# Patient Record
Sex: FEMALE | Race: WHITE | NOT HISPANIC OR LATINO | Employment: PART TIME | URBAN - METROPOLITAN AREA
[De-identification: names, ages, dates, MRNs, and addresses within clinical notes are randomized per-mention and may not be internally consistent; named-entity substitution may affect disease eponyms.]

---

## 2022-07-06 ENCOUNTER — HOSPITAL ENCOUNTER (OUTPATIENT)
Dept: RADIOLOGY | Facility: MEDICAL CENTER | Age: 22
End: 2022-07-06
Payer: COMMERCIAL

## 2022-07-06 ENCOUNTER — HOSPITAL ENCOUNTER (INPATIENT)
Facility: MEDICAL CENTER | Age: 22
LOS: 1 days | DRG: 143 | End: 2022-07-09
Attending: STUDENT IN AN ORGANIZED HEALTH CARE EDUCATION/TRAINING PROGRAM | Admitting: INTERNAL MEDICINE
Payer: COMMERCIAL

## 2022-07-06 DIAGNOSIS — J36 PERITONSILLAR ABSCESS: ICD-10-CM

## 2022-07-06 PROCEDURE — 36415 COLL VENOUS BLD VENIPUNCTURE: CPT

## 2022-07-06 PROCEDURE — 83735 ASSAY OF MAGNESIUM: CPT

## 2022-07-06 PROCEDURE — 99285 EMERGENCY DEPT VISIT HI MDM: CPT

## 2022-07-06 PROCEDURE — 85027 COMPLETE CBC AUTOMATED: CPT

## 2022-07-06 PROCEDURE — 80053 COMPREHEN METABOLIC PANEL: CPT

## 2022-07-06 PROCEDURE — 84703 CHORIONIC GONADOTROPIN ASSAY: CPT

## 2022-07-06 ASSESSMENT — PAIN DESCRIPTION - PAIN TYPE: TYPE: ACUTE PAIN

## 2022-07-07 PROBLEM — J36 PERITONSILLAR ABSCESS: Status: ACTIVE | Noted: 2022-07-07

## 2022-07-07 PROBLEM — F31.9 BIPOLAR DISORDER (HCC): Status: ACTIVE | Noted: 2022-07-07

## 2022-07-07 PROBLEM — A41.9 SEPSIS (HCC): Status: ACTIVE | Noted: 2022-07-07

## 2022-07-07 LAB
ALBUMIN SERPL BCP-MCNC: 4.1 G/DL (ref 3.2–4.9)
ALBUMIN/GLOB SERPL: 1.7 G/DL
ALP SERPL-CCNC: 61 U/L (ref 30–99)
ALT SERPL-CCNC: 11 U/L (ref 2–50)
ANION GAP SERPL CALC-SCNC: 14 MMOL/L (ref 7–16)
AST SERPL-CCNC: 13 U/L (ref 12–45)
BILIRUB SERPL-MCNC: 0.9 MG/DL (ref 0.1–1.5)
BUN SERPL-MCNC: 7 MG/DL (ref 8–22)
CALCIUM SERPL-MCNC: 8.4 MG/DL (ref 8.5–10.5)
CHLORIDE SERPL-SCNC: 104 MMOL/L (ref 96–112)
CO2 SERPL-SCNC: 19 MMOL/L (ref 20–33)
CREAT SERPL-MCNC: 0.63 MG/DL (ref 0.5–1.4)
ERYTHROCYTE [DISTWIDTH] IN BLOOD BY AUTOMATED COUNT: 42.8 FL (ref 35.9–50)
GFR SERPLBLD CREATININE-BSD FMLA CKD-EPI: 129 ML/MIN/1.73 M 2
GLOBULIN SER CALC-MCNC: 2.4 G/DL (ref 1.9–3.5)
GLUCOSE SERPL-MCNC: 113 MG/DL (ref 65–99)
HCG SERPL QL: NEGATIVE
HCT VFR BLD AUTO: 39.6 % (ref 37–47)
HGB BLD-MCNC: 13.2 G/DL (ref 12–16)
MAGNESIUM SERPL-MCNC: 1.5 MG/DL (ref 1.5–2.5)
MCH RBC QN AUTO: 30.6 PG (ref 27–33)
MCHC RBC AUTO-ENTMCNC: 33.3 G/DL (ref 33.6–35)
MCV RBC AUTO: 91.9 FL (ref 81.4–97.8)
PLATELET # BLD AUTO: 206 K/UL (ref 164–446)
PMV BLD AUTO: 12.3 FL (ref 9–12.9)
POTASSIUM SERPL-SCNC: 3.1 MMOL/L (ref 3.6–5.5)
PROT SERPL-MCNC: 6.5 G/DL (ref 6–8.2)
RBC # BLD AUTO: 4.31 M/UL (ref 4.2–5.4)
SCCMEC + MECA PNL NOSE NAA+PROBE: NEGATIVE
SODIUM SERPL-SCNC: 137 MMOL/L (ref 135–145)
WBC # BLD AUTO: 13.4 K/UL (ref 4.8–10.8)

## 2022-07-07 PROCEDURE — 700102 HCHG RX REV CODE 250 W/ 637 OVERRIDE(OP): Performed by: STUDENT IN AN ORGANIZED HEALTH CARE EDUCATION/TRAINING PROGRAM

## 2022-07-07 PROCEDURE — 96366 THER/PROPH/DIAG IV INF ADDON: CPT

## 2022-07-07 PROCEDURE — 96375 TX/PRO/DX INJ NEW DRUG ADDON: CPT

## 2022-07-07 PROCEDURE — 700111 HCHG RX REV CODE 636 W/ 250 OVERRIDE (IP): Performed by: STUDENT IN AN ORGANIZED HEALTH CARE EDUCATION/TRAINING PROGRAM

## 2022-07-07 PROCEDURE — G0378 HOSPITAL OBSERVATION PER HR: HCPCS

## 2022-07-07 PROCEDURE — 96376 TX/PRO/DX INJ SAME DRUG ADON: CPT

## 2022-07-07 PROCEDURE — 700111 HCHG RX REV CODE 636 W/ 250 OVERRIDE (IP)

## 2022-07-07 PROCEDURE — A9270 NON-COVERED ITEM OR SERVICE: HCPCS | Performed by: STUDENT IN AN ORGANIZED HEALTH CARE EDUCATION/TRAINING PROGRAM

## 2022-07-07 PROCEDURE — 99218 PR INITIAL OBSERVATION CARE,LEVL I: CPT | Mod: GC | Performed by: STUDENT IN AN ORGANIZED HEALTH CARE EDUCATION/TRAINING PROGRAM

## 2022-07-07 PROCEDURE — 87641 MR-STAPH DNA AMP PROBE: CPT

## 2022-07-07 PROCEDURE — 700102 HCHG RX REV CODE 250 W/ 637 OVERRIDE(OP)

## 2022-07-07 PROCEDURE — 700111 HCHG RX REV CODE 636 W/ 250 OVERRIDE (IP): Performed by: NURSE PRACTITIONER

## 2022-07-07 PROCEDURE — 96365 THER/PROPH/DIAG IV INF INIT: CPT

## 2022-07-07 PROCEDURE — 700105 HCHG RX REV CODE 258: Performed by: STUDENT IN AN ORGANIZED HEALTH CARE EDUCATION/TRAINING PROGRAM

## 2022-07-07 PROCEDURE — 96374 THER/PROPH/DIAG INJ IV PUSH: CPT

## 2022-07-07 PROCEDURE — A9270 NON-COVERED ITEM OR SERVICE: HCPCS

## 2022-07-07 PROCEDURE — 42700 I&D ABSCESS PERITONSILLAR: CPT

## 2022-07-07 RX ORDER — CHOLECALCIFEROL (VITAMIN D3) 125 MCG
5 CAPSULE ORAL NIGHTLY PRN
Status: DISCONTINUED | OUTPATIENT
Start: 2022-07-07 | End: 2022-07-09 | Stop reason: HOSPADM

## 2022-07-07 RX ORDER — LAMOTRIGINE 100 MG/1
25 TABLET ORAL DAILY
Status: DISCONTINUED | OUTPATIENT
Start: 2022-07-07 | End: 2022-07-09 | Stop reason: HOSPADM

## 2022-07-07 RX ORDER — ONDANSETRON 2 MG/ML
4 INJECTION INTRAMUSCULAR; INTRAVENOUS EVERY 4 HOURS PRN
Status: DISCONTINUED | OUTPATIENT
Start: 2022-07-07 | End: 2022-07-09 | Stop reason: HOSPADM

## 2022-07-07 RX ORDER — POLYETHYLENE GLYCOL 3350 17 G/17G
1 POWDER, FOR SOLUTION ORAL
Status: DISCONTINUED | OUTPATIENT
Start: 2022-07-07 | End: 2022-07-09 | Stop reason: HOSPADM

## 2022-07-07 RX ORDER — BISACODYL 10 MG
10 SUPPOSITORY, RECTAL RECTAL
Status: DISCONTINUED | OUTPATIENT
Start: 2022-07-07 | End: 2022-07-09 | Stop reason: HOSPADM

## 2022-07-07 RX ORDER — LAMOTRIGINE 25 MG/1
25 TABLET ORAL DAILY
COMMUNITY

## 2022-07-07 RX ORDER — ONDANSETRON 4 MG/1
4 TABLET, ORALLY DISINTEGRATING ORAL EVERY 4 HOURS PRN
Status: DISCONTINUED | OUTPATIENT
Start: 2022-07-07 | End: 2022-07-07

## 2022-07-07 RX ORDER — HYDROMORPHONE HYDROCHLORIDE 1 MG/ML
0.5 INJECTION, SOLUTION INTRAMUSCULAR; INTRAVENOUS; SUBCUTANEOUS ONCE
Status: COMPLETED | OUTPATIENT
Start: 2022-07-07 | End: 2022-07-07

## 2022-07-07 RX ORDER — ACETAMINOPHEN 325 MG/1
650 TABLET ORAL ONCE
Status: COMPLETED | OUTPATIENT
Start: 2022-07-07 | End: 2022-07-07

## 2022-07-07 RX ORDER — PROMETHAZINE HYDROCHLORIDE 25 MG/1
12.5-25 SUPPOSITORY RECTAL EVERY 4 HOURS PRN
Status: DISCONTINUED | OUTPATIENT
Start: 2022-07-07 | End: 2022-07-09 | Stop reason: HOSPADM

## 2022-07-07 RX ORDER — MORPHINE SULFATE 4 MG/ML
2 INJECTION INTRAVENOUS EVERY 4 HOURS PRN
Status: DISCONTINUED | OUTPATIENT
Start: 2022-07-07 | End: 2022-07-07

## 2022-07-07 RX ORDER — AMOXICILLIN 250 MG
2 CAPSULE ORAL 2 TIMES DAILY
Status: DISCONTINUED | OUTPATIENT
Start: 2022-07-07 | End: 2022-07-09 | Stop reason: HOSPADM

## 2022-07-07 RX ORDER — MORPHINE SULFATE 4 MG/ML
1 INJECTION INTRAVENOUS EVERY 4 HOURS PRN
Status: DISCONTINUED | OUTPATIENT
Start: 2022-07-07 | End: 2022-07-07

## 2022-07-07 RX ORDER — ACETAMINOPHEN 325 MG/1
650 TABLET ORAL EVERY 6 HOURS PRN
Status: DISCONTINUED | OUTPATIENT
Start: 2022-07-07 | End: 2022-07-09

## 2022-07-07 RX ORDER — KETOROLAC TROMETHAMINE 30 MG/ML
15 INJECTION, SOLUTION INTRAMUSCULAR; INTRAVENOUS EVERY 6 HOURS PRN
Status: DISCONTINUED | OUTPATIENT
Start: 2022-07-07 | End: 2022-07-09

## 2022-07-07 RX ORDER — METRONIDAZOLE 500 MG/1
500 TABLET ORAL 2 TIMES DAILY
Status: ON HOLD | COMMUNITY
Start: 2022-06-14 | End: 2022-07-09

## 2022-07-07 RX ORDER — PROCHLORPERAZINE EDISYLATE 5 MG/ML
5-10 INJECTION INTRAMUSCULAR; INTRAVENOUS EVERY 4 HOURS PRN
Status: DISCONTINUED | OUTPATIENT
Start: 2022-07-07 | End: 2022-07-09 | Stop reason: HOSPADM

## 2022-07-07 RX ORDER — SODIUM CHLORIDE, SODIUM LACTATE, POTASSIUM CHLORIDE, CALCIUM CHLORIDE 600; 310; 30; 20 MG/100ML; MG/100ML; MG/100ML; MG/100ML
INJECTION, SOLUTION INTRAVENOUS CONTINUOUS
Status: DISCONTINUED | OUTPATIENT
Start: 2022-07-07 | End: 2022-07-08

## 2022-07-07 RX ORDER — PROMETHAZINE HYDROCHLORIDE 25 MG/1
12.5-25 TABLET ORAL EVERY 4 HOURS PRN
Status: DISCONTINUED | OUTPATIENT
Start: 2022-07-07 | End: 2022-07-09 | Stop reason: HOSPADM

## 2022-07-07 RX ORDER — HYDROMORPHONE HYDROCHLORIDE 1 MG/ML
0.5 INJECTION, SOLUTION INTRAMUSCULAR; INTRAVENOUS; SUBCUTANEOUS EVERY 4 HOURS PRN
Status: DISCONTINUED | OUTPATIENT
Start: 2022-07-07 | End: 2022-07-07

## 2022-07-07 RX ORDER — HYDROMORPHONE HYDROCHLORIDE 1 MG/ML
0.5 INJECTION, SOLUTION INTRAMUSCULAR; INTRAVENOUS; SUBCUTANEOUS
Status: DISCONTINUED | OUTPATIENT
Start: 2022-07-07 | End: 2022-07-09 | Stop reason: HOSPADM

## 2022-07-07 RX ORDER — KETOROLAC TROMETHAMINE 30 MG/ML
15 INJECTION, SOLUTION INTRAMUSCULAR; INTRAVENOUS EVERY 6 HOURS PRN
Status: DISCONTINUED | OUTPATIENT
Start: 2022-07-07 | End: 2022-07-07

## 2022-07-07 RX ADMIN — BENZOCAINE, BUTAMBEN, AND TETRACAINE HYDROCHLORIDE 1 SPRAY: .028; .004; .004 AEROSOL, SPRAY TOPICAL at 00:01

## 2022-07-07 RX ADMIN — KETOROLAC TROMETHAMINE 15 MG: 30 INJECTION, SOLUTION INTRAMUSCULAR; INTRAVENOUS at 11:07

## 2022-07-07 RX ADMIN — SODIUM CHLORIDE, POTASSIUM CHLORIDE, SODIUM LACTATE AND CALCIUM CHLORIDE: 600; 310; 30; 20 INJECTION, SOLUTION INTRAVENOUS at 10:03

## 2022-07-07 RX ADMIN — FENTANYL CITRATE 100 MCG: 50 INJECTION, SOLUTION INTRAMUSCULAR; INTRAVENOUS at 00:00

## 2022-07-07 RX ADMIN — HYDROMORPHONE HYDROCHLORIDE 0.5 MG: 1 INJECTION, SOLUTION INTRAMUSCULAR; INTRAVENOUS; SUBCUTANEOUS at 17:58

## 2022-07-07 RX ADMIN — HYDROMORPHONE HYDROCHLORIDE 0.5 MG: 1 INJECTION, SOLUTION INTRAMUSCULAR; INTRAVENOUS; SUBCUTANEOUS at 09:10

## 2022-07-07 RX ADMIN — LAMOTRIGINE 25 MG: 100 TABLET ORAL at 05:49

## 2022-07-07 RX ADMIN — KETOROLAC TROMETHAMINE 15 MG: 30 INJECTION, SOLUTION INTRAMUSCULAR; INTRAVENOUS at 02:59

## 2022-07-07 RX ADMIN — SODIUM CHLORIDE 3 G: 900 INJECTION INTRAVENOUS at 05:48

## 2022-07-07 RX ADMIN — SODIUM CHLORIDE 3 G: 900 INJECTION INTRAVENOUS at 12:48

## 2022-07-07 RX ADMIN — ONDANSETRON 4 MG: 2 INJECTION INTRAMUSCULAR; INTRAVENOUS at 11:07

## 2022-07-07 RX ADMIN — SODIUM CHLORIDE, POTASSIUM CHLORIDE, SODIUM LACTATE AND CALCIUM CHLORIDE: 600; 310; 30; 20 INJECTION, SOLUTION INTRAVENOUS at 19:51

## 2022-07-07 RX ADMIN — KETOROLAC TROMETHAMINE 15 MG: 30 INJECTION, SOLUTION INTRAMUSCULAR; INTRAVENOUS at 21:44

## 2022-07-07 RX ADMIN — HYDROMORPHONE HYDROCHLORIDE 0.5 MG: 1 INJECTION, SOLUTION INTRAMUSCULAR; INTRAVENOUS; SUBCUTANEOUS at 13:57

## 2022-07-07 RX ADMIN — ACETAMINOPHEN 650 MG: 325 TABLET, FILM COATED ORAL at 01:15

## 2022-07-07 RX ADMIN — HYDROMORPHONE HYDROCHLORIDE 0.5 MG: 1 INJECTION, SOLUTION INTRAMUSCULAR; INTRAVENOUS; SUBCUTANEOUS at 05:48

## 2022-07-07 RX ADMIN — HYDROMORPHONE HYDROCHLORIDE 0.5 MG: 1 INJECTION, SOLUTION INTRAMUSCULAR; INTRAVENOUS; SUBCUTANEOUS at 10:02

## 2022-07-07 RX ADMIN — FENTANYL CITRATE 100 MCG: 50 INJECTION, SOLUTION INTRAMUSCULAR; INTRAVENOUS at 00:51

## 2022-07-07 RX ADMIN — ACETAMINOPHEN 650 MG: 325 TABLET, FILM COATED ORAL at 21:32

## 2022-07-07 RX ADMIN — PROCHLORPERAZINE EDISYLATE 5 MG: 5 INJECTION INTRAMUSCULAR; INTRAVENOUS at 17:54

## 2022-07-07 RX ADMIN — SODIUM CHLORIDE 3 G: 900 INJECTION INTRAVENOUS at 17:58

## 2022-07-07 RX ADMIN — ONDANSETRON 4 MG: 2 INJECTION INTRAMUSCULAR; INTRAVENOUS at 15:22

## 2022-07-07 RX ADMIN — SODIUM CHLORIDE, POTASSIUM CHLORIDE, SODIUM LACTATE AND CALCIUM CHLORIDE: 600; 310; 30; 20 INJECTION, SOLUTION INTRAVENOUS at 05:58

## 2022-07-07 RX ADMIN — SODIUM CHLORIDE, POTASSIUM CHLORIDE, SODIUM LACTATE AND CALCIUM CHLORIDE: 600; 310; 30; 20 INJECTION, SOLUTION INTRAVENOUS at 02:59

## 2022-07-07 ASSESSMENT — PAIN DESCRIPTION - PAIN TYPE
TYPE: ACUTE PAIN

## 2022-07-07 ASSESSMENT — ENCOUNTER SYMPTOMS
EYES NEGATIVE: 1
HEARTBURN: 0
DEPRESSION: 1
DIARRHEA: 0
WHEEZING: 0
SORE THROAT: 1
CARDIOVASCULAR NEGATIVE: 1
POLYDIPSIA: 0
ABDOMINAL PAIN: 0
DIAPHORESIS: 1
WEIGHT LOSS: 0
MYALGIAS: 1
NEUROLOGICAL NEGATIVE: 1
CHILLS: 1
SHORTNESS OF BREATH: 0
CONSTIPATION: 0
DIZZINESS: 0
RESPIRATORY NEGATIVE: 1
FEVER: 1
VOMITING: 0
BLURRED VISION: 0
COUGH: 0
NAUSEA: 1

## 2022-07-07 ASSESSMENT — LIFESTYLE VARIABLES
EVER FELT BAD OR GUILTY ABOUT YOUR DRINKING: NO
HAVE PEOPLE ANNOYED YOU BY CRITICIZING YOUR DRINKING: NO
ON A TYPICAL DAY WHEN YOU DRINK ALCOHOL HOW MANY DRINKS DO YOU HAVE: 0
CONSUMPTION TOTAL: NEGATIVE
HAVE YOU EVER FELT YOU SHOULD CUT DOWN ON YOUR DRINKING: NO
TOTAL SCORE: 0
HOW MANY TIMES IN THE PAST YEAR HAVE YOU HAD 5 OR MORE DRINKS IN A DAY: 0
EVER HAD A DRINK FIRST THING IN THE MORNING TO STEADY YOUR NERVES TO GET RID OF A HANGOVER: NO
ALCOHOL_USE: NO
AVERAGE NUMBER OF DAYS PER WEEK YOU HAVE A DRINK CONTAINING ALCOHOL: 0
TOTAL SCORE: 0
TOTAL SCORE: 0

## 2022-07-07 ASSESSMENT — PATIENT HEALTH QUESTIONNAIRE - PHQ9
6. FEELING BAD ABOUT YOURSELF - OR THAT YOU ARE A FAILURE OR HAVE LET YOURSELF OR YOUR FAMILY DOWN: SEVERAL DAYS
5. POOR APPETITE OR OVEREATING: SEVERAL DAYS
SUM OF ALL RESPONSES TO PHQ9 QUESTIONS 1 AND 2: 2
1. LITTLE INTEREST OR PLEASURE IN DOING THINGS: SEVERAL DAYS
9. THOUGHTS THAT YOU WOULD BE BETTER OFF DEAD, OR OF HURTING YOURSELF: NOT AT ALL
2. FEELING DOWN, DEPRESSED, IRRITABLE, OR HOPELESS: SEVERAL DAYS
7. TROUBLE CONCENTRATING ON THINGS, SUCH AS READING THE NEWSPAPER OR WATCHING TELEVISION: NOT AT ALL
3. TROUBLE FALLING OR STAYING ASLEEP OR SLEEPING TOO MUCH: SEVERAL DAYS
8. MOVING OR SPEAKING SO SLOWLY THAT OTHER PEOPLE COULD HAVE NOTICED. OR THE OPPOSITE, BEING SO FIGETY OR RESTLESS THAT YOU HAVE BEEN MOVING AROUND A LOT MORE THAN USUAL: NOT AT ALL
SUM OF ALL RESPONSES TO PHQ QUESTIONS 1-9: 6
4. FEELING TIRED OR HAVING LITTLE ENERGY: SEVERAL DAYS

## 2022-07-07 ASSESSMENT — PAIN SCALES - WONG BAKER: WONGBAKER_NUMERICALRESPONSE: DOESN'T HURT AT ALL

## 2022-07-07 NOTE — HOSPITAL COURSE
This  is a 21-year-old female with a past medical history significant for mood disorder on Lamictal presented to the ER on 7/7/2022 after she had noted to have throat pain.  He stated that he has been having throat pain for the last 5 days associated with fever, chills, nausea    She was initially involved in facility, noted to have 3.3 cm peritonsillar abscess, transferred here for ENT consultation.  At the outlying facility, she was started on broad-spectrum antibiotics including vancomycin and Zosyn.  During the stay in the hospital, ENT was consulted, patient underwent tonsillectomy on 7/8/2022.  It is noted that patient had activity tonsillitis of the right tonsil, edematous infected left tonsil without defined abscess.    During the stay in hospital, patient continued to remain on IV antibiotics and Unasyn.  ENT continue to follow the patient. ENT recs to discharge patient on Po abx Augmentin 875/125 1 tablet p.o. twice daily.  Patient was discharged on oxycodone, Tylenol, ibuprofen.  She will follow-up with ENT as an outpatient.    At this time, she has been medically stable to be discharged home.

## 2022-07-07 NOTE — ED NOTES
Med rec completed per patient  Allergies reviewed    Patient states she completed a 9 day course of an antibiotic approximately 2 weeks ago, she is unsure what the medication was. Home pharmacy ([facility]) is currently closed. Unable to confirm medication at this time

## 2022-07-07 NOTE — ASSESSMENT & PLAN NOTE
-Patient with 5 days sore throat. Somewhat increased risk of Lemierre's disease but unlikely. No CT evidence of David's angina. Most likely peritonsillar abscess vs. Phlegmon, so far not amenable to aspiration.  -ENT consulted -attempted aspiration in ER but failed; anticipated surgical intervention on 7/8 at 1700  -Unasyn  -IVF as indicated, continuous 83cc/hour currently, s/p IVF per sepsis protocol at outside hospital  -Monitor patient after procedure for pain management and complications post surgery

## 2022-07-07 NOTE — CARE PLAN
The patient is Stable - Low risk of patient condition declining or worsening    Shift Goals  Clinical Goals: pain control, ABX  Patient Goals: pain control  Family Goals: pain control    Progress made toward(s) clinical / shift goals:    Problem: Pain - Standard  Goal: Alleviation of pain or a reduction in pain to the patient’s comfort goal  Outcome: Progressing     Problem: Knowledge Deficit - Standard  Goal: Patient and family/care givers will demonstrate understanding of plan of care, disease process/condition, diagnostic tests and medications  Outcome: Progressing       Patient is not progressing towards the following goals:

## 2022-07-07 NOTE — PROGRESS NOTES
Med rec updated     Patient home pharmacy reports patient received a 7 day course of Flagyl on 06/14/2022

## 2022-07-07 NOTE — ASSESSMENT & PLAN NOTE
This is Sepsis Present on admission  SIRS criteria identified on my evaluation include: Fever, with temperature greater than 101 deg F, Tachycardia, with heart rate greater than 90 BPM and Tachypnea, with respirations greater than 20 per minute  Source is Peritonsillar abscess  Sepsis protocol initiated  Fluid resuscitation ordered per protocol  Crystalloid Fluid Administration: Fluid resuscitation ordered per standard protocol - 30 mL/kg per current or ideal body weight  IV antibiotics as appropriate for source of sepsis  Reassessment: I have reassessed the patient's hemodynamic status

## 2022-07-07 NOTE — ED PROVIDER NOTES
CHIEF COMPLAINT  Chief Complaint   Patient presents with   • Abscess       HPI  Denia Lewis is a 21 y.o. female who presents as a transfer from [facility] in [place] for evaluation of a left 3 x 3 cm peritonsillar abscess which was unable to be drained at the outside facility.  States that since Friday she has had a sore throat that progressively got worse today so she went to the ER.  She was initially complaining of 7 out of 10 throbbing sensation on the left side of her neck.  Did complain of pain with swallowing though denied any inability to swallow.  She was seen at the outside facility where she had a CT neck which did show a left-sided peritonsillar abscess attempted aspiration was unsuccessful in the emergency department as such she was transferred for ear nose throat evaluation.  In the emergency departments at the outside facility the patient was given Zosyn and vancomycin, she had a brief period of hypotension with a blood pressure in the 90 systolic range after IV fluids as such she was started on Levophed.  This was discontinued immediately upon arrival in the emergency department  REVIEW OF SYSTEMS  See HPI for further details. All other systems are negative.     PAST MEDICAL HISTORY   has a past medical history of Psychiatric disorder.    SOCIAL HISTORY  Social History     Tobacco Use   • Smoking status: Never Smoker   • Smokeless tobacco: Never Used   Vaping Use   • Vaping Use: Never used   Substance and Sexual Activity   • Alcohol use: Never   • Drug use: Never   • Sexual activity: Not on file       SURGICAL HISTORY  patient denies any surgical history    CURRENT MEDICATIONS  Home Medications     Reviewed by Josie Rueda R.N. (Registered Nurse) on 07/06/22 at 3358  Med List Status: Not Addressed   Medication Last Dose Status        Patient James Taking any Medications                       ALLERGIES  No Known Allergies    FAMILY HISTORY  No pertinent family history    PHYSICAL EXAM   /57   " Pulse (!) 120   Temp (!) 38.4 °C (101.1 °F) (Oral)   Resp 18   Ht 1.651 m (5' 5\")   Wt 90.7 kg (200 lb)   SpO2 96%   BMI 33.28 kg/m²  @[id number]::@   Pulse ox interpretation: I interpret this pulse ox as normal.  VITALS - vital signs documented prior to this note have been reviewed and noted,  GENERAL - awake, alert, oriented, GCS 15, no apparent distress, non-toxic  appearing  HEENT - normocephalic, atraumatic, pupils equal, sclera anicteric, mucus  membranes moist she has pharyngeal exudates on the left and a bulging erythematous tonsillar pillar on the left  NECK - supple, no meningismus, full active range of motion, trachea midline  CARDIOVASCULAR - regular rate/rhythm, no murmurs/gallops/rubs  PULMONARY - no respiratory distress, speaking in full sentences, clear to  auscultation bilaterally, no wheezing/ronchi/rales, no accessory muscle use  GASTROINTESTINAL - soft, non-tender, non-distended, no rebound, guarding,  or peritonitis  GENITOURINARY - Deferred  NEUROLOGIC - Awake alert, normal mental status, speech fluid, cognition  normal, moves all extremities  MUSCULOSKELETAL - no obvious asymmetry or deformities present  EXTREMITIES - warm, well-perfused, no cyanosis or significant edema  DERMATOLOGIC - warm, dry, no rashes, no jaundice  PSYCHIATRIC - normal affect, normal insight, normal concentration            LABS      Labs Reviewed - No data to display      Pertinent Labs & Imaging studies reviewed. (See chart for details)    RADIOLOGY  No orders to display             ED COURSE/PROCEDURES      Abscess drainage (Incision/Drainage)  Performed by: Dr Huerta  Consent: Verbal consent obtained.  required.  Type: abscess  Location details: Left tonsillar pillars  Anesthesia: local infiltration  Local anesthetic: lidocaine 1% with epinephrine atomized onto the tonsils  Anesthetic total: 10 ml  Patient sedated: no  Scalpel size: 18-gauge needle  Incision type: 3 simple stab incisions along the superior " middle and inferior tonsillar pillars  Complexity: simple  Drainage: None  Patient tolerance: Patient tolerated the procedure well with no immediate complications.    Medications - No data to display            MEDICAL DECISION MAKING    Patient presented for evaluation of a peritonsillar abscess which was unavailable to be drained at the outside facility.  Review of labs and imaging did appear consistent with a PTA.  I did discuss case with on-call otolaryngologist  who recommended repeat attempt at drainage and if again failed in the ED admission and she will evaluate the patient in the morning.   Aspiration was unsuccessful in the emergency department, patient did tolerate the procedure well, may represent an underlying phlegmon given the multiple failed aspiration attempt, continued tachycardia, SIRS syndrome she will be admitted to the hospital for further care and evaluation pending ENT consult in the morning.      FINAL IMPRESSION  1.  Peritonsillar abscess           Electronically signed by: Nikolai Bernal D.O., 7/6/2022 11:02 PM      Dictation Disclaimer  Please note this report has been produced using speech recognition software and  may contain errors related to that system, including errors seen in grammar,  punctuation and spelling, as well as words and phrases that may be inappropriate.  If there are any questions or concerns, please feel free to contact the dictating  physician for clarification.

## 2022-07-07 NOTE — H&P
Surgery Otolaryngology History & Physical Note    Date  7/7/2022    Primary Care Physician  No primary care provider on file.    CC  Left peritonsillar space infection    HPI  This is a 21 y.o. female who presented with left throat infection, transferred from outside hospital.  Per ED notes, sore throat since Friday, went to OSH with sepsis and CT c/w left peritonsillar phlegmon.  OSH ED attempted drainage unsuccessfully.  Pt was trasnferred for ENT evaluation.  ED provider attempted I&D again with no purulence. No significant change in symptoms since last night.  No ear pain, trismus, or dyspnea.  She is having odynophagia and sore throat. She has a history of previous tonsillar infections but no known abscess.     Past Medical History:   Diagnosis Date   • Psychiatric disorder     bipolar disorder       History reviewed. No pertinent surgical history.    Current Facility-Administered Medications   Medication Dose Route Frequency Provider Last Rate Last Admin   • senna-docusate (PERICOLACE or SENOKOT S) 8.6-50 MG per tablet 2 Tablet  2 Tablet Oral BID Clemente Pugh M.D.        And   • polyethylene glycol/lytes (MIRALAX) PACKET 1 Packet  1 Packet Oral QDAY PRN Clemente Pugh M.D.        And   • magnesium hydroxide (MILK OF MAGNESIA) suspension 30 mL  30 mL Oral QDAY PRN Clemente Pugh M.D.        And   • bisacodyl (DULCOLAX) suppository 10 mg  10 mg Rectal QDAY PRN Clemente Pugh M.D.       • lactated ringers infusion   Intravenous Continuous Clemente Pugh M.D. 83 mL/hr at 07/07/22 0558 New Bag at 07/07/22 0558   • ondansetron (ZOFRAN) syringe/vial injection 4 mg  4 mg Intravenous Q4HRS PRN Clemente Pugh M.D.       • [Held by provider] promethazine (PHENERGAN) tablet 12.5-25 mg  12.5-25 mg Oral Q4HRS PRN Clemente Pugh M.D.       • promethazine (PHENERGAN) suppository 12.5-25 mg  12.5-25 mg Rectal Q4HRS PRN Clemente Pugh M.D.       • prochlorperazine (COMPAZINE) injection 5-10 mg  5-10 mg  Intravenous Q4HRS PRN Clemente Pugh M.D.       • melatonin tablet 5 mg  5 mg Oral HS PRN Clemente Pugh M.D.       • lamoTRIgine (LAMICTAL) tablet 25 mg  25 mg Oral DAILY Clemente Pugh M.D.   25 mg at 07/07/22 0549   • ampicillin/sulbactam (UNASYN) 3 g in  mL IVPB  3 g Intravenous Q6HRS Clemente Pugh M.D. 200 mL/hr at 07/07/22 0548 3 g at 07/07/22 0548   • HYDROmorphone (Dilaudid) injection 0.5 mg  0.5 mg Intravenous Q4HRS PRN Clemente Pugh M.D.           Social History     Socioeconomic History   • Marital status: Single     Spouse name: Not on file   • Number of children: Not on file   • Years of education: Not on file   • Highest education level: Not on file   Occupational History   • Not on file   Tobacco Use   • Smoking status: Never Smoker   • Smokeless tobacco: Never Used   Vaping Use   • Vaping Use: Never used   Substance and Sexual Activity   • Alcohol use: Never   • Drug use: Never   • Sexual activity: Not on file   Other Topics Concern   • Not on file   Social History Narrative   • Not on file     Social Determinants of Health     Financial Resource Strain: Not on file   Food Insecurity: Not on file   Transportation Needs: Not on file   Physical Activity: Not on file   Stress: Not on file   Social Connections: Not on file   Intimate Partner Violence: Not on file   Housing Stability: Not on file       History reviewed. No pertinent family history.    Allergies  Patient has no known allergies.    Review of Systems  Negative except for as per HPI    Physical Exam  Constitutional:       Appearance: Normal appearance.   HENT:      Head: Normocephalic and atraumatic.      Right Ear: External ear normal.      Left Ear: External ear normal.      Nose: Nose normal.      Mouth/Throat:      Comments: No trismus, no uvular deviation, L>R tonsillar edema and erythema  Musculoskeletal:      Cervical back: Normal range of motion.   Neurological:      Mental Status: She is alert.         Vital  Signs  Blood Pressure: (!) 95/59   Temperature: 36.8 °C (98.3 °F)   Pulse: 83   Respiration: 20   Pulse Oximetry: 95 %       Labs:  Recent Labs     07/06/22  2304   WBC 13.4*   RBC 4.31   HEMOGLOBIN 13.2   HEMATOCRIT 39.6   MCV 91.9   MCH 30.6   MCHC 33.3*   RDW 42.8   PLATELETCT 206   MPV 12.3     Recent Labs     07/06/22  2304   SODIUM 137   POTASSIUM 3.1*   CHLORIDE 104   CO2 19*   GLUCOSE 113*   BUN 7*   CREATININE 0.63   CALCIUM 8.4*         Recent Labs     07/06/22  2304   ASTSGOT 13   ALTSGPT 11   TBILIRUBIN 0.9   ALKPHOSPHAT 61   GLOBULIN 2.4       Radiology:  CT-FOREIGN FILM CAT SCAN   Final Result      OUTSIDE IMAGES-DX CHEST   Final Result          Assessment/Plan:  Left peritonsillar space infection with two unsuccessful attempts at drainage, consistent with phlegmon on imaging and symptoms.  Recommend continuing IV antibiotics and observe for 24 hours.  If symptoms not improving by tomorrow AM, would consider OR tomorrow PM for I&D of left peritonsillar space and possible tonsillectomy.

## 2022-07-07 NOTE — ED NOTES
Pt transferred out of ED at this time with Liz lowery via gurney. Pt is A&Ox4, with stable vital signs and no apparent distress upon transfer. Pt's chart and personal belongings sent with pt to T216.

## 2022-07-07 NOTE — H&P
History & Physical Note    Date of Admission: 7/7/2022  Admission Status: Observation-Outpatient  [facility] Team: GILLIAN  Attending: Lee Arnold M.D.   Senior Resident: Dr. Pugh  Contact Number: [phone]    Chief Complaint:  Throat pain     History of Present Illness (HPI):   Denia is a 21 y.o. female who presented 7/6/2022 with PMH mood disorder on lamotrigine, seen after 5 days throat pain followed by fevers and chills, myalgias, was seen at [facility] in [place] for evaluation of a left 3 x 3 cm peritonsillar abscess which was unable to be drained at the outside facility. CT neck showed left-sided peritonsillar abscess, and patient was transferred here for ENT eval after unsuccessful aspiration.  At outside hospital patient was started on vanc/zosyn, given one dose of toradol which helped her pain, later zosyn which helped some but not as effective as the toradol.     In the ED she has some persistent tachycardia, after consulted ENT the ED again attempted aspiration without any output. ENT will see in the morning. Admitted for OBS, IV antibiotics, and possible surgical intervention after ENT evaluation for peritonsillar abscess.    Review of Systems:   Review of Systems   Constitutional: Positive for chills, diaphoresis, fever and malaise/fatigue. Negative for weight loss.   HENT: Positive for sore throat.         Painful swallowing and swelling of tonsil   Eyes: Negative for blurred vision.   Respiratory: Negative for cough, shortness of breath and wheezing.    Cardiovascular: Negative for chest pain and leg swelling.   Gastrointestinal: Positive for nausea. Negative for abdominal pain, constipation, diarrhea, heartburn and vomiting.   Genitourinary: Negative for dysuria.   Musculoskeletal: Positive for myalgias.   Skin: Negative for itching and rash.   Neurological: Negative for dizziness.   Endo/Heme/Allergies: Negative for polydipsia.   All other systems reviewed and are negative.        Past Medical History:    Past Medical History was reviewed with patient.   has a past medical history of Psychiatric disorder.    Past Surgical History: Past Surgical History was reviewed with patient.   has no past surgical history on file.    Medications: Medications have been reviewed with patient.  Prior to Admission Medications   Prescriptions Last Dose Informant Patient Reported? Taking?   MELATONIN PO PRN at PRN Patient Yes Yes   Sig: Take 1 Tablet by mouth at bedtime as needed (Sleep).   lamoTRIgine (LAMICTAL) 25 MG Tab 7/6/2022 at AM Patient Yes Yes   Sig: Take 25 mg by mouth every day.   vitamin D3 (VITAMIN D3) 5000 Unit (125 mcg) Tab 7/6/2022 at AM Patient Yes Yes   Sig: Take 1,000 Units by mouth every day.      Facility-Administered Medications: None        Allergies: Allergies have been reviewed with patient.  No Known Allergies    Family History:   family history is not on file.     Social History:   Tobacco: No tobacco use past 2 years   Alcohol: declines  Recreational drugs (illegal and prescription):  Declines   Activity Level: ambulatory/active  Living situation:  Home with boyfriend living nearby, has kids.  Recent travel:  declines  Primary Care Provider: reviewed No primary care provider on file.  Other (stressors, spirituality, exposures):    Physical Exam:   Vitals:  Temp:  [38.4 °C (101.1 °F)] 38.4 °C (101.1 °F)  Pulse:  [120-126] 123  Resp:  [16-32] 32  BP: (100-111)/(52-57) 111/52  SpO2:  [95 %-97 %] 97 %    Physical Exam  Constitutional:       General: She is not in acute distress.     Appearance: Normal appearance. She is not ill-appearing.   HENT:      Head: Normocephalic.      Nose: Nose normal. No congestion or rhinorrhea.      Mouth/Throat:      Mouth: Mucous membranes are moist.      Comments: Patient with swollen left tonsil, erythematous, some evidence of recent aspirations, possible very small foci of pus but difficult to tell if expected change from these interventions. Not touching right tonsil  Eyes:       General: No scleral icterus.     Conjunctiva/sclera: Conjunctivae normal.   Cardiovascular:      Rate and Rhythm: Normal rate and regular rhythm.      Pulses: Normal pulses.      Heart sounds: Normal heart sounds. No murmur heard.  Pulmonary:      Effort: Pulmonary effort is normal. No respiratory distress.      Breath sounds: No wheezing.   Abdominal:      General: Abdomen is flat. There is no distension.      Palpations: Abdomen is soft.      Tenderness: There is no abdominal tenderness. There is no guarding.   Musculoskeletal:         General: No swelling.      Cervical back: No rigidity.      Right lower leg: No edema.      Left lower leg: No edema.   Skin:     General: Skin is warm.   Neurological:      General: No focal deficit present.      Mental Status: She is alert and oriented to person, place, and time.   Psychiatric:         Mood and Affect: Mood normal.         Thought Content: Thought content normal.         Judgment: Judgment normal.         Labs:   No results found for: WBC, RBC, HEMOGLOBIN, HEMATOCRIT, MCV, MCH, MCHC, MPV, NEUTSPOLYS, LYMPHOCYTES, MONOCYTES, EOSINOPHILS, BASOPHILS, HYPOCHROMIA, ANISOCYTOSIS     No results found for: SODIUM, POTASSIUM, CHLORIDE, CO2, GLUCOSE, BUN, CREATININE, BUNCREATRAT, GLOMRATE       EKG: Per my read, no ecg on file    Imaging:   CT head outside facility  2.2 x 2.8 x 2.6 cm left tonsillar abscess mild right tonsillar enlargement no adenopathy no significant fat stranding    Previous Data Review: reviewed    Problem Representation:        Patient with sepsis criteria likely to meet for 2 midnight stay    * Peritonsillar abscess- (present on admission)  Assessment & Plan  Patient with 5 days sore throat. Somewhat increased risk of Lemierre's disease but unlikely. No CT evidence of David's angina. Most likely peritonsillar abscess vs. Phlegmon, so far not amenable to aspiration.  -ENT consulted appreciate recs  -Unasyn  -IVF as indicated, continuous 83cc/hour  currently, s/p IVF per sepsis protocol at outside hospital      Sepsis ([facility])  Assessment & Plan  This is Sepsis Present on admission  SIRS criteria identified on my evaluation include: Fever, with temperature greater than 101 deg F  Source is tonsillar abscess  Sepsis protocol initiated  Fluid resuscitation ordered per protocol - s/p IVF sepsis protocol at outside hospital  Crystalloid Fluid Administration: Fluid resuscitation ordered per standard protocol   IV antibiotics as appropriate for source of sepsis  Reassessment: I have reassessed the patient's hemodynamic status          Bipolar disorder ([facility])  Assessment & Plan  Continue home lamotrigine

## 2022-07-07 NOTE — ASSESSMENT & PLAN NOTE
This is Sepsis Present on admission  SIRS criteria identified on my evaluation include: Fever, with temperature greater than 101 deg F  Source is tonsillar abscess  Sepsis protocol initiated  Fluid resuscitation ordered per protocol - s/p IVF sepsis protocol at outside hospital  Crystalloid Fluid Administration: Fluid resuscitation ordered per standard protocol   IV antibiotics as appropriate for source of sepsis  Reassessment: I have reassessed the patient's hemodynamic status

## 2022-07-07 NOTE — PROGRESS NOTES
Hospital Medicine Daily Progress Note    Date of Service  7/7/2022    Chief Complaint  Denia Lewis is a 21 y.o. female admitted 7/6/2022 with throat pain    Hospital Course  Ms. Denia Lewis is a 21-year-old female with a PMHx of mood disorder on Lamictal, who presented on 7/7/2022 due to throat pain.    Patient reports having throat pain for the past 5 days associated with fevers, chills, myalgias.  Patient originally presented herself to [facility] in [facility] for evaluation.  Noted a left 3 x 3 cm peritonsillar abscess which the facility was unable to drain.  CT of the neck noted left-sided peritonsillar abscess and patient was endorsed to be transferred to [facility] for an ENT evaluation after the unsuccessful aspiration.  At [facility], patient was initiated on vancomycin and Zosyn and was given a dose of Toradol to help with pain.    In our ER, patient had persistent tachycardia, ERP consulted ENT to attempt aspiration again.  Unfortunately, no fluid was able to be aspirated.  Patient will be admitted under observation for continuation of IV antibiotics, and surgical intervention from ENT for peritonsillar abscess.      Interval Problem Update  -Patient seen and examined.  Patient endorses severe throat pain.  Patient reports Toradol worked while she was at [facility] and was given a dose here in ER.  Discussed surgical intervention to be done with ENT, Dr. Melendez on 7/8/2022.  At this time, we will start patient on full liquid diet, start n.p.o. at midnight.  We will also do pain management.  -Plan of care: Continue pain management; continue IV ABX; anticipated surgical intervention with ENT on 7/8 -start n.p.o. at midnight  -Disposition: Anticipated to stay overnight for pain management, and surgical intervention with ENT on 7/8  -Lab work: Reviewed; expected  -VSS at this time      I have discussed this patient's plan of care and discharge plan at IDT rounds today with Case Management, Nursing,  Nursing leadership, and other members of the IDT team.    Consultants/Specialty  ENT  -  [place]    Code Status  Full Code    Disposition  Patient is not medically cleared for discharge.   Anticipate discharge to to home with close outpatient follow-up.  I have placed the appropriate orders for post-discharge needs.    Review of Systems  Review of Systems   Constitutional: Positive for chills, fever and malaise/fatigue.   HENT: Negative.    Eyes: Negative.    Respiratory: Negative.    Cardiovascular: Negative.    Genitourinary: Negative.    Musculoskeletal: Positive for myalgias.   Skin: Negative.    Neurological: Negative.    Psychiatric/Behavioral: Positive for depression.        Physical Exam  Temp:  [35.9 °C (96.7 °F)-38.4 °C (101.1 °F)] 36.5 °C (97.7 °F)  Pulse:  [] 97  Resp:  [16-32] 18  BP: ()/(52-60) 97/60  SpO2:  [95 %-97 %] 96 %    Physical Exam  Vitals and nursing note reviewed.   HENT:      Head: Normocephalic.      Salivary Glands: Left salivary gland is tender.      Right Ear: Tympanic membrane normal.      Nose: Nose normal.      Mouth/Throat:      Mouth: Mucous membranes are dry.   Eyes:      Extraocular Movements: Extraocular movements intact.      Pupils: Pupils are equal, round, and reactive to light.   Cardiovascular:      Rate and Rhythm: Regular rhythm. Tachycardia present.      Pulses: Normal pulses.      Heart sounds: Normal heart sounds.   Pulmonary:      Effort: Pulmonary effort is normal.      Breath sounds: Normal breath sounds.   Abdominal:      General: Bowel sounds are normal.      Palpations: Abdomen is soft.   Musculoskeletal:         General: Tenderness present.      Cervical back: Neck supple. Tenderness present.   Skin:     General: Skin is dry.   Neurological:      Mental Status: She is alert. Mental status is at baseline.         Fluids  No intake or output data in the 24 hours ending 07/07/22 0922    Laboratory  Recent Labs     07/06/22  2304   WBC 13.4*   RBC  4.31   HEMOGLOBIN 13.2   HEMATOCRIT 39.6   MCV 91.9   MCH 30.6   MCHC 33.3*   RDW 42.8   PLATELETCT 206   MPV 12.3     Recent Labs     07/06/22  2304   SODIUM 137   POTASSIUM 3.1*   CHLORIDE 104   CO2 19*   GLUCOSE 113*   BUN 7*   CREATININE 0.63   CALCIUM 8.4*                   Imaging  CT-FOREIGN FILM CAT SCAN   Final Result      OUTSIDE IMAGES-DX CHEST   Final Result           Assessment/Plan  * Peritonsillar abscess- (present on admission)  Assessment & Plan  -Patient with 5 days sore throat. Somewhat increased risk of Lemierre's disease but unlikely. No CT evidence of David's angina. Most likely peritonsillar abscess vs. Phlegmon, so far not amenable to aspiration.  -ENT consulted -attempted aspiration in ER but failed; anticipated surgical intervention on 7/8  -Unasyn  -IVF as indicated, continuous 83cc/hour currently, s/p IVF per sepsis protocol at outside hospital    Sepsis ([facility])  Assessment & Plan  This is Sepsis Present on admission  SIRS criteria identified on my evaluation include: Fever, with temperature greater than 101 deg F, Tachycardia, with heart rate greater than 90 BPM and Tachypnea, with respirations greater than 20 per minute  Source is Peritonsillar abscess  Sepsis protocol initiated  Fluid resuscitation ordered per protocol  Crystalloid Fluid Administration: Fluid resuscitation ordered per standard protocol - 30 mL/kg per current or ideal body weight  IV antibiotics as appropriate for source of sepsis  Reassessment: I have reassessed the patient's hemodynamic status          Bipolar disorder (HCC)  Assessment & Plan  Continue home lamotrigine        VTE prophylaxis: SCDs/TEDs    I have performed a physical exam and reviewed and updated ROS and Plan today (7/7/2022). In review of yesterday's note (7/6/2022), there are no changes except as documented above.    ========================================================================================================  Please note that this  dictation was created using voice recognition software. I have made every reasonable attempt to correct obvious errors, but there may be errors of grammar and possibly content that I did not discover before finalizing the note.    Electronically signed by:  ARIANA Vogel, MSN, APRN, FNP-C  Hospitalist Services  [facility]  [phone]  [email or web address]  07/07/22                    0918

## 2022-07-07 NOTE — ASSESSMENT & PLAN NOTE
Patient with 5 days sore throat. Somewhat increased risk of Lemierre's disease but unlikely. No CT evidence of David's angina. Most likely peritonsillar abscess vs. Phlegmon, so far not amenable to aspiration.  -ENT consulted appreciate recs  -Unasyn  -IVF as indicated, continuous 83cc/hour currently, s/p IVF per sepsis protocol at outside hospital

## 2022-07-07 NOTE — ED TRIAGE NOTES
"  Chief Complaint   Patient presents with   • Abscess     Pt transferred with [facility] from [place] for peritonsillar abscess. Previous facility attempted to drain abscess but was unsuccessful. Pt is A&Ox4, no dysphonia, tachycardiac and febrile on arrival. Denies SOB, managing secretions well. Dr. Bernal at bedside.     ./57   Pulse (!) 120   Temp (!) 38.4 °C (101.1 °F) (Oral)   Resp 18   Ht 1.651 m (5' 5\")   Wt 90.7 kg (200 lb)   SpO2 96%   BMI 33.28 kg/m²     "

## 2022-07-07 NOTE — PROGRESS NOTES
Assuming care of pt. Pt arrival to room via gurney, successfully  transferred to bed. Room devices reviewed with patient. Plan of care and shift goals discussed . No questions or concerns. Pt is experiencing 7/10 pain at the back of her throat, will notify MD now. Bed locked, lowest position, call light within reach, belongings within reach, lines and devices intact. Will continue to monitor.

## 2022-07-07 NOTE — PROGRESS NOTES
4 Eyes Skin Assessment Completed by Frank, RN and KALA Real.    Head WDL  Ears WDL  Nose WDL  Mouth WDL  Neck WDL  Breast/Chest WDL  Shoulder Blades WDL  Spine WDL  (R) Arm/Elbow/Hand WDL  (L) Arm/Elbow/Hand WDL  Abdomen WDL  Groin WDL  Scrotum/Coccyx/Buttocks WDL  (R) Leg WDL  (L) Leg WDL  (R) Heel/Foot/Toe WDL  (L) Heel/Foot/Toe WDL          Devices In Places Blood Pressure Cuff and Pulse Ox      Interventions In Place N/A    Possible Skin Injury No    Pictures Uploaded Into Epic N/A  Wound Consult Placed N/A  RN Wound Prevention Protocol Ordered No

## 2022-07-08 ENCOUNTER — ANESTHESIA EVENT (OUTPATIENT)
Dept: SURGERY | Facility: MEDICAL CENTER | Age: 22
DRG: 143 | End: 2022-07-08
Payer: COMMERCIAL

## 2022-07-08 ENCOUNTER — ANESTHESIA (OUTPATIENT)
Dept: SURGERY | Facility: MEDICAL CENTER | Age: 22
DRG: 143 | End: 2022-07-08
Payer: COMMERCIAL

## 2022-07-08 LAB
ANION GAP SERPL CALC-SCNC: 8 MMOL/L (ref 7–16)
BLOOD CULTURE HOLD CXBCH: NORMAL
BUN SERPL-MCNC: 4 MG/DL (ref 8–22)
CALCIUM SERPL-MCNC: 8.8 MG/DL (ref 8.5–10.5)
CHLORIDE SERPL-SCNC: 108 MMOL/L (ref 96–112)
CO2 SERPL-SCNC: 24 MMOL/L (ref 20–33)
CREAT SERPL-MCNC: 0.49 MG/DL (ref 0.5–1.4)
ERYTHROCYTE [DISTWIDTH] IN BLOOD BY AUTOMATED COUNT: 44.3 FL (ref 35.9–50)
GFR SERPLBLD CREATININE-BSD FMLA CKD-EPI: 137 ML/MIN/1.73 M 2
GLUCOSE SERPL-MCNC: 131 MG/DL (ref 65–99)
HCT VFR BLD AUTO: 33.2 % (ref 37–47)
HGB BLD-MCNC: 10.9 G/DL (ref 12–16)
MCH RBC QN AUTO: 30.7 PG (ref 27–33)
MCHC RBC AUTO-ENTMCNC: 32.8 G/DL (ref 33.6–35)
MCV RBC AUTO: 93.5 FL (ref 81.4–97.8)
PLATELET # BLD AUTO: 164 K/UL (ref 164–446)
PMV BLD AUTO: 11.5 FL (ref 9–12.9)
POTASSIUM SERPL-SCNC: 3.5 MMOL/L (ref 3.6–5.5)
RBC # BLD AUTO: 3.55 M/UL (ref 4.2–5.4)
SODIUM SERPL-SCNC: 140 MMOL/L (ref 135–145)
WBC # BLD AUTO: 13.5 K/UL (ref 4.8–10.8)

## 2022-07-08 PROCEDURE — 770001 HCHG ROOM/CARE - MED/SURG/GYN PRIV*

## 2022-07-08 PROCEDURE — 87075 CULTR BACTERIA EXCEPT BLOOD: CPT

## 2022-07-08 PROCEDURE — 99232 SBSQ HOSP IP/OBS MODERATE 35: CPT | Performed by: NURSE PRACTITIONER

## 2022-07-08 PROCEDURE — 700102 HCHG RX REV CODE 250 W/ 637 OVERRIDE(OP): Performed by: STUDENT IN AN ORGANIZED HEALTH CARE EDUCATION/TRAINING PROGRAM

## 2022-07-08 PROCEDURE — 160036 HCHG PACU - EA ADDL 30 MINS PHASE I: Performed by: OTOLARYNGOLOGY

## 2022-07-08 PROCEDURE — 0CTPXZZ RESECTION OF TONSILS, EXTERNAL APPROACH: ICD-10-PCS | Performed by: OTOLARYNGOLOGY

## 2022-07-08 PROCEDURE — 160035 HCHG PACU - 1ST 60 MINS PHASE I: Performed by: OTOLARYNGOLOGY

## 2022-07-08 PROCEDURE — 160038 HCHG SURGERY MINUTES - EA ADDL 1 MIN LEVEL 2: Performed by: OTOLARYNGOLOGY

## 2022-07-08 PROCEDURE — 80048 BASIC METABOLIC PNL TOTAL CA: CPT

## 2022-07-08 PROCEDURE — A9270 NON-COVERED ITEM OR SERVICE: HCPCS | Performed by: ANESTHESIOLOGY

## 2022-07-08 PROCEDURE — 36415 COLL VENOUS BLD VENIPUNCTURE: CPT

## 2022-07-08 PROCEDURE — 87205 SMEAR GRAM STAIN: CPT

## 2022-07-08 PROCEDURE — 700111 HCHG RX REV CODE 636 W/ 250 OVERRIDE (IP): Performed by: ANESTHESIOLOGY

## 2022-07-08 PROCEDURE — 00170 ANES INTRAORAL PX NOS: CPT | Performed by: ANESTHESIOLOGY

## 2022-07-08 PROCEDURE — 85027 COMPLETE CBC AUTOMATED: CPT

## 2022-07-08 PROCEDURE — 96366 THER/PROPH/DIAG IV INF ADDON: CPT

## 2022-07-08 PROCEDURE — 700111 HCHG RX REV CODE 636 W/ 250 OVERRIDE (IP): Performed by: NURSE PRACTITIONER

## 2022-07-08 PROCEDURE — 700111 HCHG RX REV CODE 636 W/ 250 OVERRIDE (IP): Performed by: STUDENT IN AN ORGANIZED HEALTH CARE EDUCATION/TRAINING PROGRAM

## 2022-07-08 PROCEDURE — 700101 HCHG RX REV CODE 250: Performed by: ANESTHESIOLOGY

## 2022-07-08 PROCEDURE — A9270 NON-COVERED ITEM OR SERVICE: HCPCS | Performed by: OTOLARYNGOLOGY

## 2022-07-08 PROCEDURE — 160002 HCHG RECOVERY MINUTES (STAT): Performed by: OTOLARYNGOLOGY

## 2022-07-08 PROCEDURE — 700102 HCHG RX REV CODE 250 W/ 637 OVERRIDE(OP): Performed by: OTOLARYNGOLOGY

## 2022-07-08 PROCEDURE — 700105 HCHG RX REV CODE 258: Performed by: STUDENT IN AN ORGANIZED HEALTH CARE EDUCATION/TRAINING PROGRAM

## 2022-07-08 PROCEDURE — A9270 NON-COVERED ITEM OR SERVICE: HCPCS | Performed by: STUDENT IN AN ORGANIZED HEALTH CARE EDUCATION/TRAINING PROGRAM

## 2022-07-08 PROCEDURE — 700102 HCHG RX REV CODE 250 W/ 637 OVERRIDE(OP): Performed by: ANESTHESIOLOGY

## 2022-07-08 PROCEDURE — 87147 CULTURE TYPE IMMUNOLOGIC: CPT

## 2022-07-08 PROCEDURE — 160048 HCHG OR STATISTICAL LEVEL 1-5: Performed by: OTOLARYNGOLOGY

## 2022-07-08 PROCEDURE — 87070 CULTURE OTHR SPECIMN AEROBIC: CPT

## 2022-07-08 PROCEDURE — 96376 TX/PRO/DX INJ SAME DRUG ADON: CPT

## 2022-07-08 PROCEDURE — 160009 HCHG ANES TIME/MIN: Performed by: OTOLARYNGOLOGY

## 2022-07-08 PROCEDURE — 87077 CULTURE AEROBIC IDENTIFY: CPT

## 2022-07-08 PROCEDURE — 160027 HCHG SURGERY MINUTES - 1ST 30 MINS LEVEL 2: Performed by: OTOLARYNGOLOGY

## 2022-07-08 PROCEDURE — 87186 SC STD MICRODIL/AGAR DIL: CPT

## 2022-07-08 PROCEDURE — 700105 HCHG RX REV CODE 258: Performed by: ANESTHESIOLOGY

## 2022-07-08 PROCEDURE — 88304 TISSUE EXAM BY PATHOLOGIST: CPT | Mod: 59

## 2022-07-08 RX ORDER — AMPICILLIN AND SULBACTAM 2; 1 G/1; G/1
INJECTION, POWDER, FOR SOLUTION INTRAMUSCULAR; INTRAVENOUS PRN
Status: DISCONTINUED | OUTPATIENT
Start: 2022-07-08 | End: 2022-07-08 | Stop reason: SURG

## 2022-07-08 RX ORDER — ONDANSETRON 2 MG/ML
INJECTION INTRAMUSCULAR; INTRAVENOUS PRN
Status: DISCONTINUED | OUTPATIENT
Start: 2022-07-08 | End: 2022-07-08 | Stop reason: SURG

## 2022-07-08 RX ORDER — MIDAZOLAM HYDROCHLORIDE 1 MG/ML
1 INJECTION INTRAMUSCULAR; INTRAVENOUS
Status: DISCONTINUED | OUTPATIENT
Start: 2022-07-08 | End: 2022-07-08 | Stop reason: HOSPADM

## 2022-07-08 RX ORDER — METOPROLOL TARTRATE 1 MG/ML
INJECTION, SOLUTION INTRAVENOUS PRN
Status: DISCONTINUED | OUTPATIENT
Start: 2022-07-08 | End: 2022-07-08 | Stop reason: SURG

## 2022-07-08 RX ORDER — METOPROLOL TARTRATE 1 MG/ML
1 INJECTION, SOLUTION INTRAVENOUS
Status: DISCONTINUED | OUTPATIENT
Start: 2022-07-08 | End: 2022-07-08 | Stop reason: HOSPADM

## 2022-07-08 RX ORDER — HYDROMORPHONE HYDROCHLORIDE 1 MG/ML
0.2 INJECTION, SOLUTION INTRAMUSCULAR; INTRAVENOUS; SUBCUTANEOUS
Status: DISCONTINUED | OUTPATIENT
Start: 2022-07-08 | End: 2022-07-08 | Stop reason: HOSPADM

## 2022-07-08 RX ORDER — OXYCODONE HCL 5 MG/5 ML
5 SOLUTION, ORAL ORAL
Status: COMPLETED | OUTPATIENT
Start: 2022-07-08 | End: 2022-07-08

## 2022-07-08 RX ORDER — SODIUM CHLORIDE, SODIUM LACTATE, POTASSIUM CHLORIDE, CALCIUM CHLORIDE 600; 310; 30; 20 MG/100ML; MG/100ML; MG/100ML; MG/100ML
INJECTION, SOLUTION INTRAVENOUS CONTINUOUS
Status: DISCONTINUED | OUTPATIENT
Start: 2022-07-08 | End: 2022-07-08 | Stop reason: HOSPADM

## 2022-07-08 RX ORDER — OXYCODONE HCL 5 MG/5 ML
10 SOLUTION, ORAL ORAL
Status: COMPLETED | OUTPATIENT
Start: 2022-07-08 | End: 2022-07-08

## 2022-07-08 RX ORDER — MEPERIDINE HYDROCHLORIDE 25 MG/ML
12.5 INJECTION INTRAMUSCULAR; INTRAVENOUS; SUBCUTANEOUS
Status: DISCONTINUED | OUTPATIENT
Start: 2022-07-08 | End: 2022-07-08 | Stop reason: HOSPADM

## 2022-07-08 RX ORDER — HALOPERIDOL 5 MG/ML
1 INJECTION INTRAMUSCULAR
Status: DISCONTINUED | OUTPATIENT
Start: 2022-07-08 | End: 2022-07-08 | Stop reason: HOSPADM

## 2022-07-08 RX ORDER — HYDROXYZINE 50 MG/1
25 TABLET, FILM COATED ORAL 3 TIMES DAILY PRN
Status: DISCONTINUED | OUTPATIENT
Start: 2022-07-08 | End: 2022-07-09 | Stop reason: HOSPADM

## 2022-07-08 RX ORDER — KETOROLAC TROMETHAMINE 30 MG/ML
INJECTION, SOLUTION INTRAMUSCULAR; INTRAVENOUS PRN
Status: DISCONTINUED | OUTPATIENT
Start: 2022-07-08 | End: 2022-07-08 | Stop reason: SURG

## 2022-07-08 RX ORDER — MIDAZOLAM HYDROCHLORIDE 1 MG/ML
INJECTION INTRAMUSCULAR; INTRAVENOUS PRN
Status: DISCONTINUED | OUTPATIENT
Start: 2022-07-08 | End: 2022-07-08 | Stop reason: SURG

## 2022-07-08 RX ORDER — ALBUTEROL SULFATE 2.5 MG/3ML
2.5 SOLUTION RESPIRATORY (INHALATION)
Status: DISCONTINUED | OUTPATIENT
Start: 2022-07-08 | End: 2022-07-08 | Stop reason: HOSPADM

## 2022-07-08 RX ORDER — HYDROMORPHONE HYDROCHLORIDE 1 MG/ML
0.4 INJECTION, SOLUTION INTRAMUSCULAR; INTRAVENOUS; SUBCUTANEOUS
Status: DISCONTINUED | OUTPATIENT
Start: 2022-07-08 | End: 2022-07-08 | Stop reason: HOSPADM

## 2022-07-08 RX ORDER — LABETALOL HYDROCHLORIDE 5 MG/ML
5 INJECTION, SOLUTION INTRAVENOUS
Status: DISCONTINUED | OUTPATIENT
Start: 2022-07-08 | End: 2022-07-08 | Stop reason: HOSPADM

## 2022-07-08 RX ORDER — ONDANSETRON 2 MG/ML
4 INJECTION INTRAMUSCULAR; INTRAVENOUS
Status: DISCONTINUED | OUTPATIENT
Start: 2022-07-08 | End: 2022-07-08 | Stop reason: HOSPADM

## 2022-07-08 RX ORDER — HYDROMORPHONE HYDROCHLORIDE 1 MG/ML
0.1 INJECTION, SOLUTION INTRAMUSCULAR; INTRAVENOUS; SUBCUTANEOUS
Status: DISCONTINUED | OUTPATIENT
Start: 2022-07-08 | End: 2022-07-08 | Stop reason: HOSPADM

## 2022-07-08 RX ORDER — HYDRALAZINE HYDROCHLORIDE 20 MG/ML
5 INJECTION INTRAMUSCULAR; INTRAVENOUS
Status: DISCONTINUED | OUTPATIENT
Start: 2022-07-08 | End: 2022-07-08 | Stop reason: HOSPADM

## 2022-07-08 RX ORDER — ROCURONIUM BROMIDE 10 MG/ML
INJECTION, SOLUTION INTRAVENOUS PRN
Status: DISCONTINUED | OUTPATIENT
Start: 2022-07-08 | End: 2022-07-08 | Stop reason: SURG

## 2022-07-08 RX ORDER — DIPHENHYDRAMINE HYDROCHLORIDE 50 MG/ML
12.5 INJECTION INTRAMUSCULAR; INTRAVENOUS
Status: DISCONTINUED | OUTPATIENT
Start: 2022-07-08 | End: 2022-07-08 | Stop reason: HOSPADM

## 2022-07-08 RX ORDER — DEXAMETHASONE SODIUM PHOSPHATE 4 MG/ML
INJECTION, SOLUTION INTRA-ARTICULAR; INTRALESIONAL; INTRAMUSCULAR; INTRAVENOUS; SOFT TISSUE PRN
Status: DISCONTINUED | OUTPATIENT
Start: 2022-07-08 | End: 2022-07-08 | Stop reason: SURG

## 2022-07-08 RX ADMIN — LAMOTRIGINE 25 MG: 100 TABLET ORAL at 04:40

## 2022-07-08 RX ADMIN — KETOROLAC TROMETHAMINE 15 MG: 30 INJECTION, SOLUTION INTRAMUSCULAR at 18:36

## 2022-07-08 RX ADMIN — FENTANYL CITRATE 50 MCG: 50 INJECTION, SOLUTION INTRAMUSCULAR; INTRAVENOUS at 17:45

## 2022-07-08 RX ADMIN — SODIUM CHLORIDE, POTASSIUM CHLORIDE, SODIUM LACTATE AND CALCIUM CHLORIDE: 600; 310; 30; 20 INJECTION, SOLUTION INTRAVENOUS at 19:01

## 2022-07-08 RX ADMIN — ACETAMINOPHEN 650 MG: 325 TABLET, FILM COATED ORAL at 10:02

## 2022-07-08 RX ADMIN — PROPOFOL 150 MG: 10 INJECTION, EMULSION INTRAVENOUS at 17:52

## 2022-07-08 RX ADMIN — KETOROLAC TROMETHAMINE 15 MG: 30 INJECTION, SOLUTION INTRAMUSCULAR; INTRAVENOUS at 10:59

## 2022-07-08 RX ADMIN — SUGAMMADEX 200 MG: 100 INJECTION, SOLUTION INTRAVENOUS at 18:39

## 2022-07-08 RX ADMIN — FENTANYL CITRATE 50 MCG: 50 INJECTION, SOLUTION INTRAMUSCULAR; INTRAVENOUS at 18:27

## 2022-07-08 RX ADMIN — FENTANYL CITRATE 50 MCG: 50 INJECTION, SOLUTION INTRAMUSCULAR; INTRAVENOUS at 17:59

## 2022-07-08 RX ADMIN — KETOROLAC TROMETHAMINE 15 MG: 30 INJECTION, SOLUTION INTRAMUSCULAR; INTRAVENOUS at 04:40

## 2022-07-08 RX ADMIN — SODIUM CHLORIDE, POTASSIUM CHLORIDE, SODIUM LACTATE AND CALCIUM CHLORIDE: 600; 310; 30; 20 INJECTION, SOLUTION INTRAVENOUS at 07:42

## 2022-07-08 RX ADMIN — FENTANYL CITRATE 50 MCG: 50 INJECTION, SOLUTION INTRAMUSCULAR; INTRAVENOUS at 17:49

## 2022-07-08 RX ADMIN — AMPICILLIN SODIUM AND SULBACTAM SODIUM 3 G: 2; 1 INJECTION, POWDER, FOR SOLUTION INTRAMUSCULAR; INTRAVENOUS at 18:09

## 2022-07-08 RX ADMIN — SODIUM CHLORIDE 3 G: 900 INJECTION INTRAVENOUS at 06:02

## 2022-07-08 RX ADMIN — SODIUM CHLORIDE 3 G: 900 INJECTION INTRAVENOUS at 12:19

## 2022-07-08 RX ADMIN — HALOPERIDOL LACTATE 1 MG: 5 INJECTION, SOLUTION INTRAMUSCULAR at 18:58

## 2022-07-08 RX ADMIN — ACETAMINOPHEN 650 MG: 325 TABLET, FILM COATED ORAL at 22:51

## 2022-07-08 RX ADMIN — SODIUM CHLORIDE 3 G: 900 INJECTION INTRAVENOUS at 01:30

## 2022-07-08 RX ADMIN — FENTANYL CITRATE 50 MCG: 50 INJECTION, SOLUTION INTRAMUSCULAR; INTRAVENOUS at 18:59

## 2022-07-08 RX ADMIN — ROCURONIUM BROMIDE 40 MG: 10 INJECTION, SOLUTION INTRAVENOUS at 17:52

## 2022-07-08 RX ADMIN — HYDROMORPHONE HYDROCHLORIDE 0.5 MG: 1 INJECTION, SOLUTION INTRAMUSCULAR; INTRAVENOUS; SUBCUTANEOUS at 09:12

## 2022-07-08 RX ADMIN — MEPERIDINE HYDROCHLORIDE 12.5 MG: 25 INJECTION INTRAMUSCULAR; INTRAVENOUS; SUBCUTANEOUS at 19:09

## 2022-07-08 RX ADMIN — OXYCODONE HYDROCHLORIDE 10 MG: 5 SOLUTION ORAL at 18:57

## 2022-07-08 RX ADMIN — ROCURONIUM BROMIDE 10 MG: 10 INJECTION, SOLUTION INTRAVENOUS at 18:27

## 2022-07-08 RX ADMIN — MIDAZOLAM HYDROCHLORIDE 1 MG: 1 INJECTION, SOLUTION INTRAMUSCULAR; INTRAVENOUS at 17:45

## 2022-07-08 RX ADMIN — FENTANYL CITRATE 50 MCG: 50 INJECTION, SOLUTION INTRAMUSCULAR; INTRAVENOUS at 18:49

## 2022-07-08 RX ADMIN — ONDANSETRON 4 MG: 2 INJECTION INTRAMUSCULAR; INTRAVENOUS at 18:14

## 2022-07-08 RX ADMIN — PROPOFOL 50 MG: 10 INJECTION, EMULSION INTRAVENOUS at 17:56

## 2022-07-08 RX ADMIN — DEXAMETHASONE SODIUM PHOSPHATE 8 MG: 4 INJECTION, SOLUTION INTRA-ARTICULAR; INTRALESIONAL; INTRAMUSCULAR; INTRAVENOUS; SOFT TISSUE at 17:57

## 2022-07-08 RX ADMIN — IBUPROFEN 600 MG: 100 SUSPENSION ORAL at 19:12

## 2022-07-08 RX ADMIN — MIDAZOLAM HYDROCHLORIDE 1 MG: 1 INJECTION, SOLUTION INTRAMUSCULAR; INTRAVENOUS at 18:49

## 2022-07-08 RX ADMIN — METOPROLOL TARTRATE 1 MG: 5 INJECTION, SOLUTION INTRAVENOUS at 18:28

## 2022-07-08 ASSESSMENT — ENCOUNTER SYMPTOMS
CARDIOVASCULAR NEGATIVE: 1
MYALGIAS: 1
RESPIRATORY NEGATIVE: 1
DEPRESSION: 1
CHILLS: 1
NEUROLOGICAL NEGATIVE: 1
EYES NEGATIVE: 1
FEVER: 1

## 2022-07-08 ASSESSMENT — PAIN DESCRIPTION - PAIN TYPE
TYPE: SURGICAL PAIN
TYPE: ACUTE PAIN
TYPE: SURGICAL PAIN
TYPE: ACUTE PAIN
TYPE: SURGICAL PAIN
TYPE: ACUTE PAIN
TYPE: SURGICAL PAIN
TYPE: ACUTE PAIN
TYPE: SURGICAL PAIN

## 2022-07-08 ASSESSMENT — PAIN SCALES - GENERAL: PAIN_LEVEL: 3

## 2022-07-08 ASSESSMENT — PATIENT HEALTH QUESTIONNAIRE - PHQ9
1. LITTLE INTEREST OR PLEASURE IN DOING THINGS: NOT AT ALL
2. FEELING DOWN, DEPRESSED, IRRITABLE, OR HOPELESS: NOT AT ALL
SUM OF ALL RESPONSES TO PHQ9 QUESTIONS 1 AND 2: 0

## 2022-07-08 NOTE — CARE PLAN
The patient is Stable - Low risk of patient condition declining or worsening    Shift Goals  Clinical Goals: pain control, ABX, surgery  Patient Goals: surgery and pain control  Family Goals: pain control, DC    Progress made toward(s) clinical / shift goals:    Problem: Pain - Standard  Goal: Alleviation of pain or a reduction in pain to the patient’s comfort goal  Outcome: Progressing     Problem: Knowledge Deficit - Standard  Goal: Patient and family/care givers will demonstrate understanding of plan of care, disease process/condition, diagnostic tests and medications  Outcome: Progressing       Patient is not progressing towards the following goals:

## 2022-07-08 NOTE — PROGRESS NOTES
Hospital Medicine Daily Progress Note    Date of Service  7/8/2022    Chief Complaint  Denia Lewis is a 21 y.o. female admitted 7/6/2022 with throat pain    Hospital Course  Ms. Denia Lewis is a 21-year-old female with a PMHx of mood disorder on Lamictal, who presented on 7/7/2022 due to throat pain.    Patient reports having throat pain for the past 5 days associated with fevers, chills, myalgias.  Patient originally presented herself to [facility] in [facility] for evaluation.  Noted a left 3 x 3 cm peritonsillar abscess which the facility was unable to drain.  CT of the neck noted left-sided peritonsillar abscess and patient was endorsed to be transferred to [facility] for an ENT evaluation after the unsuccessful aspiration.  At [facility], patient was initiated on vancomycin and Zosyn and was given a dose of Toradol to help with pain.    In our ER, patient had persistent tachycardia, ERP consulted ENT to attempt aspiration again.  Unfortunately, no fluid was able to be aspirated.  Patient will be admitted under observation for continuation of IV antibiotics, and surgical intervention from ENT for peritonsillar abscess.      Interval Problem Update  7/7/2022  -Patient seen and examined.  Patient endorses severe throat pain.  Patient reports Toradol worked while she was at [facility] and was given a dose here in ER.  Discussed surgical intervention to be done with ENT, Dr. Melendez on 7/8/2022.  At this time, we will start patient on full liquid diet, start n.p.o. at midnight.  We will also do pain management.  -Plan of care: Continue pain management; continue IV ABX; anticipated surgical intervention with ENT on 7/8 -start n.p.o. at midnight  -Disposition: Anticipated to stay overnight for pain management, and surgical intervention with ENT on 7/8  -Lab work: Reviewed; expected  -VSS at this time    7/8/2022  -Patient seen and examined. Patient still complains of sore throat. Discussed with patient surgical  intervention today at 5pm with ENT Dr. Melendez.  Patient be placed on full liquid diet and will be n.p.o. starting at 1400 today for anticipated procedure.  Patient updated in plan of care.  -Plan of care: Pain management as indicated; full liquid diet and start n.p.o. at 1400; postprocedure today, patient will be monitored for pain management.  -Disposition: Patient will be switched to inpatient status for treatment pacing as ENT wanted to see response to initial treatment; ENT to pursue surgical intervention and would like to monitor patient overnight for pain management  -Lab work: Reviewed; expected  -VSS at this time      I have discussed this patient's plan of care and discharge plan at IDT rounds today with Case Management, Nursing, Nursing leadership, and other members of the IDT team.    Consultants/Specialty  ENT  -  [place]    Code Status  Full Code    Disposition  Patient is not medically cleared for discharge.   Anticipate discharge to to home with close outpatient follow-up.  I have placed the appropriate orders for post-discharge needs.    Review of Systems  Review of Systems   Constitutional: Positive for chills, fever and malaise/fatigue.   HENT: Negative.    Eyes: Negative.    Respiratory: Negative.    Cardiovascular: Negative.    Genitourinary: Negative.    Musculoskeletal: Positive for myalgias.   Skin: Negative.    Neurological: Negative.    Psychiatric/Behavioral: Positive for depression.        Physical Exam  Temp:  [36.1 °C (97 °F)-37.5 °C (99.5 °F)] 36.4 °C (97.6 °F)  Pulse:  [] 78  Resp:  [16-18] 18  BP: ()/(51-58) 99/51  SpO2:  [94 %-97 %] 97 %    Physical Exam  Vitals and nursing note reviewed.   HENT:      Head: Normocephalic.      Salivary Glands: Left salivary gland is tender.      Right Ear: Tympanic membrane normal.      Nose: Nose normal.      Mouth/Throat:      Mouth: Mucous membranes are dry.   Eyes:      Extraocular Movements: Extraocular movements intact.       Pupils: Pupils are equal, round, and reactive to light.   Cardiovascular:      Rate and Rhythm: Regular rhythm. Tachycardia present.      Pulses: Normal pulses.      Heart sounds: Normal heart sounds.   Pulmonary:      Effort: Pulmonary effort is normal.      Breath sounds: Normal breath sounds.   Abdominal:      General: Bowel sounds are normal.      Palpations: Abdomen is soft.   Musculoskeletal:         General: Tenderness present.      Cervical back: Neck supple. Tenderness present.   Skin:     General: Skin is dry.   Neurological:      Mental Status: She is alert. Mental status is at baseline.         Fluids    Intake/Output Summary (Last 24 hours) at 7/8/2022 0941  Last data filed at 7/7/2022 1758  Gross per 24 hour   Intake --   Output 30 ml   Net -30 ml       Laboratory  Recent Labs     07/06/22 2304 07/08/22  0159   WBC 13.4* 13.5*   RBC 4.31 3.55*   HEMOGLOBIN 13.2 10.9*   HEMATOCRIT 39.6 33.2*   MCV 91.9 93.5   MCH 30.6 30.7   MCHC 33.3* 32.8*   RDW 42.8 44.3   PLATELETCT 206 164   MPV 12.3 11.5     Recent Labs     07/06/22 2304 07/08/22  0159   SODIUM 137 140   POTASSIUM 3.1* 3.5*   CHLORIDE 104 108   CO2 19* 24   GLUCOSE 113* 131*   BUN 7* 4*   CREATININE 0.63 0.49*   CALCIUM 8.4* 8.8                   Imaging  CT-FOREIGN FILM CAT SCAN   Final Result      OUTSIDE IMAGES-DX CHEST   Final Result           Assessment/Plan  * Peritonsillar abscess- (present on admission)  Assessment & Plan  -Patient with 5 days sore throat. Somewhat increased risk of Lemierre's disease but unlikely. No CT evidence of David's angina. Most likely peritonsillar abscess vs. Phlegmon, so far not amenable to aspiration.  -ENT consulted -attempted aspiration in ER but failed; anticipated surgical intervention on 7/8 at 1700  -Unasyn  -IVF as indicated, continuous 83cc/hour currently, s/p IVF per sepsis protocol at outside hospital  -Monitor patient after procedure for pain management and complications post surgery    Sepsis  ([facility])  Assessment & Plan  This is Sepsis Present on admission  SIRS criteria identified on my evaluation include: Fever, with temperature greater than 101 deg F, Tachycardia, with heart rate greater than 90 BPM and Tachypnea, with respirations greater than 20 per minute  Source is Peritonsillar abscess  Sepsis protocol initiated  Fluid resuscitation ordered per protocol  Crystalloid Fluid Administration: Fluid resuscitation ordered per standard protocol - 30 mL/kg per current or ideal body weight  IV antibiotics as appropriate for source of sepsis  Reassessment: I have reassessed the patient's hemodynamic status          Bipolar disorder ([facility])  Assessment & Plan  Continue home lamotrigine        VTE prophylaxis: SCDs/TEDs    I have performed a physical exam and reviewed and updated ROS and Plan today (7/8/2022). In review of yesterday's note (7/7/2022), there are no changes except as documented above.    ========================================================================================================  Please note that this dictation was created using voice recognition software. I have made every reasonable attempt to correct obvious errors, but there may be errors of grammar and possibly content that I did not discover before finalizing the note.    Electronically signed by:  ARIANA Vogel, MSN, APRN, FNP-C  Hospitalist Services  [facility]  [phone]  [email or web address]  07/08/22                 6029

## 2022-07-08 NOTE — CARE PLAN
The patient is Stable - Low risk of patient condition declining or worsening    Shift Goals  Clinical Goals: pain & nausea control and IV abx  Patient Goals: pain control and rest  Family Goals: pain control    Progress made toward(s) clinical / shift goals:      Problem: Knowledge Deficit - Standard  Goal: Patient and family/care givers will demonstrate understanding of plan of care, disease process/condition, diagnostic tests and medications  Outcome: Progressing     Problem: Pain - Standard  Goal: Alleviation of pain or a reduction in pain to the patient’s comfort goal  Outcome: Progressing     Patient is not progressing towards the following goals:

## 2022-07-08 NOTE — PROGRESS NOTES
Assessment completed. Patient is aox4, vital signs stable. Patient updated on plan of care, all needs met at this time. Bed locked and in lowest position. Call light and personal belongings within reach. Threaded socks in place. Bed locked and in lowest position. Call light and personal belongings within reach.

## 2022-07-09 ENCOUNTER — PHARMACY VISIT (OUTPATIENT)
Dept: PHARMACY | Facility: MEDICAL CENTER | Age: 22
End: 2022-07-09
Payer: COMMERCIAL

## 2022-07-09 VITALS
OXYGEN SATURATION: 98 % | SYSTOLIC BLOOD PRESSURE: 116 MMHG | TEMPERATURE: 98.7 F | BODY MASS INDEX: 33.32 KG/M2 | HEART RATE: 52 BPM | HEIGHT: 65 IN | DIASTOLIC BLOOD PRESSURE: 78 MMHG | WEIGHT: 200 LBS | RESPIRATION RATE: 17 BRPM

## 2022-07-09 PROBLEM — J36 PERITONSILLAR ABSCESS: Status: RESOLVED | Noted: 2022-07-07 | Resolved: 2022-07-09

## 2022-07-09 PROBLEM — A41.9 SEPSIS (HCC): Status: RESOLVED | Noted: 2022-07-07 | Resolved: 2022-07-09

## 2022-07-09 LAB
ANION GAP SERPL CALC-SCNC: 12 MMOL/L (ref 7–16)
BUN SERPL-MCNC: 4 MG/DL (ref 8–22)
CALCIUM SERPL-MCNC: 9.2 MG/DL (ref 8.5–10.5)
CHLORIDE SERPL-SCNC: 104 MMOL/L (ref 96–112)
CO2 SERPL-SCNC: 23 MMOL/L (ref 20–33)
CREAT SERPL-MCNC: 0.5 MG/DL (ref 0.5–1.4)
ERYTHROCYTE [DISTWIDTH] IN BLOOD BY AUTOMATED COUNT: 42.5 FL (ref 35.9–50)
GFR SERPLBLD CREATININE-BSD FMLA CKD-EPI: 136 ML/MIN/1.73 M 2
GLUCOSE SERPL-MCNC: 140 MG/DL (ref 65–99)
GRAM STN SPEC: NORMAL
HCT VFR BLD AUTO: 32.7 % (ref 37–47)
HGB BLD-MCNC: 11 G/DL (ref 12–16)
MCH RBC QN AUTO: 30.9 PG (ref 27–33)
MCHC RBC AUTO-ENTMCNC: 33.6 G/DL (ref 33.6–35)
MCV RBC AUTO: 91.9 FL (ref 81.4–97.8)
PLATELET # BLD AUTO: 168 K/UL (ref 164–446)
PMV BLD AUTO: 12 FL (ref 9–12.9)
POTASSIUM SERPL-SCNC: 4 MMOL/L (ref 3.6–5.5)
RBC # BLD AUTO: 3.56 M/UL (ref 4.2–5.4)
SIGNIFICANT IND 70042: NORMAL
SITE SITE: NORMAL
SODIUM SERPL-SCNC: 139 MMOL/L (ref 135–145)
SOURCE SOURCE: NORMAL
WBC # BLD AUTO: 9.4 K/UL (ref 4.8–10.8)

## 2022-07-09 PROCEDURE — 700102 HCHG RX REV CODE 250 W/ 637 OVERRIDE(OP): Performed by: OTOLARYNGOLOGY

## 2022-07-09 PROCEDURE — RXMED WILLOW AMBULATORY MEDICATION CHARGE: Performed by: HOSPITALIST

## 2022-07-09 PROCEDURE — 700111 HCHG RX REV CODE 636 W/ 250 OVERRIDE (IP): Performed by: STUDENT IN AN ORGANIZED HEALTH CARE EDUCATION/TRAINING PROGRAM

## 2022-07-09 PROCEDURE — A9270 NON-COVERED ITEM OR SERVICE: HCPCS | Performed by: STUDENT IN AN ORGANIZED HEALTH CARE EDUCATION/TRAINING PROGRAM

## 2022-07-09 PROCEDURE — 700105 HCHG RX REV CODE 258: Performed by: STUDENT IN AN ORGANIZED HEALTH CARE EDUCATION/TRAINING PROGRAM

## 2022-07-09 PROCEDURE — A9270 NON-COVERED ITEM OR SERVICE: HCPCS | Performed by: HOSPITALIST

## 2022-07-09 PROCEDURE — 36415 COLL VENOUS BLD VENIPUNCTURE: CPT

## 2022-07-09 PROCEDURE — 99239 HOSP IP/OBS DSCHRG MGMT >30: CPT | Performed by: HOSPITALIST

## 2022-07-09 PROCEDURE — 85027 COMPLETE CBC AUTOMATED: CPT

## 2022-07-09 PROCEDURE — 80048 BASIC METABOLIC PNL TOTAL CA: CPT

## 2022-07-09 PROCEDURE — 700111 HCHG RX REV CODE 636 W/ 250 OVERRIDE (IP): Performed by: NURSE PRACTITIONER

## 2022-07-09 PROCEDURE — 700102 HCHG RX REV CODE 250 W/ 637 OVERRIDE(OP): Performed by: STUDENT IN AN ORGANIZED HEALTH CARE EDUCATION/TRAINING PROGRAM

## 2022-07-09 PROCEDURE — 700102 HCHG RX REV CODE 250 W/ 637 OVERRIDE(OP): Performed by: HOSPITALIST

## 2022-07-09 PROCEDURE — A9270 NON-COVERED ITEM OR SERVICE: HCPCS | Performed by: OTOLARYNGOLOGY

## 2022-07-09 RX ORDER — IBUPROFEN 600 MG/1
600 TABLET ORAL EVERY 6 HOURS PRN
Status: DISCONTINUED | OUTPATIENT
Start: 2022-07-09 | End: 2022-07-09 | Stop reason: HOSPADM

## 2022-07-09 RX ORDER — OMEPRAZOLE 20 MG/1
20 CAPSULE, DELAYED RELEASE ORAL DAILY
Status: DISCONTINUED | OUTPATIENT
Start: 2022-07-09 | End: 2022-07-09 | Stop reason: HOSPADM

## 2022-07-09 RX ORDER — POLYETHYLENE GLYCOL 3350 17 G/17G
17 POWDER, FOR SOLUTION ORAL
Qty: 15 EACH | Refills: 0 | Status: SHIPPED | OUTPATIENT
Start: 2022-07-09

## 2022-07-09 RX ORDER — OXYCODONE HYDROCHLORIDE 10 MG/1
10 TABLET ORAL EVERY 6 HOURS PRN
Qty: 20 TABLET | Refills: 0 | Status: ON HOLD | OUTPATIENT
Start: 2022-07-09 | End: 2022-07-15

## 2022-07-09 RX ORDER — IBUPROFEN 600 MG/1
600 TABLET ORAL EVERY 6 HOURS PRN
Qty: 28 TABLET | Refills: 0 | Status: SHIPPED | OUTPATIENT
Start: 2022-07-09 | End: 2022-07-16

## 2022-07-09 RX ORDER — OMEPRAZOLE 20 MG/1
20 CAPSULE, DELAYED RELEASE ORAL DAILY
Qty: 30 CAPSULE | Refills: 0 | Status: SHIPPED | OUTPATIENT
Start: 2022-07-09

## 2022-07-09 RX ORDER — ACETAMINOPHEN 500 MG
500-1000 TABLET ORAL EVERY 6 HOURS PRN
Qty: 28 TABLET | Refills: 0 | Status: SHIPPED | OUTPATIENT
Start: 2022-07-09 | End: 2022-07-16

## 2022-07-09 RX ORDER — OXYCODONE HYDROCHLORIDE 10 MG/1
10 TABLET ORAL EVERY 4 HOURS PRN
Status: DISCONTINUED | OUTPATIENT
Start: 2022-07-09 | End: 2022-07-09 | Stop reason: HOSPADM

## 2022-07-09 RX ORDER — IBUPROFEN 600 MG/1
600 TABLET ORAL EVERY 6 HOURS
Status: DISCONTINUED | OUTPATIENT
Start: 2022-07-09 | End: 2022-07-09

## 2022-07-09 RX ORDER — AMOXICILLIN AND CLAVULANATE POTASSIUM 875; 125 MG/1; MG/1
1 TABLET, FILM COATED ORAL 2 TIMES DAILY
Qty: 14 TABLET | Refills: 0 | Status: SHIPPED | OUTPATIENT
Start: 2022-07-09 | End: 2022-07-16

## 2022-07-09 RX ORDER — ACETAMINOPHEN 325 MG/1
650 TABLET ORAL EVERY 6 HOURS PRN
Status: DISCONTINUED | OUTPATIENT
Start: 2022-07-09 | End: 2022-07-09 | Stop reason: HOSPADM

## 2022-07-09 RX ORDER — AMOXICILLIN 250 MG
2 CAPSULE ORAL 2 TIMES DAILY
Qty: 30 TABLET | Refills: 0 | Status: SHIPPED | OUTPATIENT
Start: 2022-07-09

## 2022-07-09 RX ORDER — OXYCODONE HYDROCHLORIDE 5 MG/1
5 TABLET ORAL EVERY 4 HOURS PRN
Status: DISCONTINUED | OUTPATIENT
Start: 2022-07-09 | End: 2022-07-09 | Stop reason: HOSPADM

## 2022-07-09 RX ADMIN — IBUPROFEN 600 MG: 600 TABLET, FILM COATED ORAL at 08:24

## 2022-07-09 RX ADMIN — SENNOSIDES AND DOCUSATE SODIUM 2 TABLET: 50; 8.6 TABLET ORAL at 06:05

## 2022-07-09 RX ADMIN — SODIUM CHLORIDE 3 G: 900 INJECTION INTRAVENOUS at 06:05

## 2022-07-09 RX ADMIN — Medication 5 MG: at 00:48

## 2022-07-09 RX ADMIN — OMEPRAZOLE 20 MG: 20 CAPSULE, DELAYED RELEASE ORAL at 09:59

## 2022-07-09 RX ADMIN — SODIUM CHLORIDE 3 G: 900 INJECTION INTRAVENOUS at 00:13

## 2022-07-09 RX ADMIN — KETOROLAC TROMETHAMINE 15 MG: 30 INJECTION, SOLUTION INTRAMUSCULAR; INTRAVENOUS at 06:20

## 2022-07-09 RX ADMIN — SODIUM CHLORIDE 3 G: 900 INJECTION INTRAVENOUS at 12:21

## 2022-07-09 RX ADMIN — LAMOTRIGINE 25 MG: 100 TABLET ORAL at 06:04

## 2022-07-09 ASSESSMENT — COGNITIVE AND FUNCTIONAL STATUS - GENERAL
SUGGESTED CMS G CODE MODIFIER MOBILITY: CJ
DAILY ACTIVITIY SCORE: 24
WALKING IN HOSPITAL ROOM: A LITTLE
MOBILITY SCORE: 22
CLIMB 3 TO 5 STEPS WITH RAILING: A LITTLE
SUGGESTED CMS G CODE MODIFIER DAILY ACTIVITY: CH

## 2022-07-09 ASSESSMENT — PAIN DESCRIPTION - PAIN TYPE: TYPE: SURGICAL PAIN

## 2022-07-09 NOTE — ANESTHESIA POSTPROCEDURE EVALUATION
Patient: Denia Lewis    Procedure Summary     Date: 07/08/22 Room / Location: [place] [address] / SURGERY [facility]    Anesthesia Start: 1745 Anesthesia Stop: 1854    Procedure: TONSILLECTOMY (Throat) Diagnosis: (Left Tonsillar Abscess)    Surgeons: Danyell Melendez M.D. Responsible Provider: Adam Narayanan M.D.    Anesthesia Type: general ASA Status: 2          Final Anesthesia Type: general  Last vitals  BP   Blood Pressure: 131/70    Temp   36.3 °C (97.3 °F)    Pulse   (!) 105   Resp   17    SpO2   100 %      Anesthesia Post Evaluation    Patient location during evaluation: PACU  Patient participation: complete - patient participated  Level of consciousness: awake and alert  Pain score: 3    Airway patency: patent  Anesthetic complications: no  Cardiovascular status: hemodynamically stable  Respiratory status: acceptable  Hydration status: euvolemic    PONV: none          No complications documented.     Nurse Pain Score: 3 (NPRS)

## 2022-07-09 NOTE — PROGRESS NOTES
Received bedside report from night shift nurse. Patient resting in bed, call light and personal belongings within reach, bed in the low and locked position with upper side rails up. Assessment complete, vital signs stable, all needs met at this time. Hourly rounding in place, plan to tolerate ordered diet, monitor swelling/ secretions, and discharge plan discussed with patient, patient verbalized understanding.

## 2022-07-09 NOTE — PROGRESS NOTES
Report received from Giana ARMENDARIZ.   pt care assumed. Pt aaox4, no signs of distress noted at this time. Patient resting comfortably in bed. Fiance at bedside. POC discussed with pt and verbalizes no questions. Pt c/o of no pain. Pt denies any additional needs at this time. Bed in lowest position, pt educated on fall risk and verbalized understanding, call light within reach.

## 2022-07-09 NOTE — PROGRESS NOTES
4 Eyes Skin Assessment Completed by KALA Luis and KALA Pham.    Head WDL  Ears WDL  Nose WDL  Mouth Dry lips  Neck left side slightly swollen  Breast/Chest WDL  Shoulder Blades WDL  Spine WDL  (R) Arm/Elbow/Hand WDL  (L) Arm/Elbow/Hand WDL  Abdomen  Red stretch marks on sides  Groin WDL  Scrotum/Coccyx/Buttocks Redness on left lower but cheek, blanchable  (R) Leg WDL  (L) Leg WDL  (R) Heel/Foot/Toe Callus on big toe, dry  (L) Heel/Foot/Toe Callus on big toe, dry          Devices In Places Blood Pressure Cuff      Interventions In Place Pillows    Possible Skin Injury No    Pictures Uploaded Into Epic N/A  Wound Consult Placed N/A  RN Wound Prevention Protocol Ordered No

## 2022-07-09 NOTE — DISCHARGE SUMMARY
Discharge Summary    CHIEF COMPLAINT ON ADMISSION  Chief Complaint   Patient presents with   • Abscess       Reason for Admission  EMS      Admission Date  7/6/2022    CODE STATUS  Full Code    HPI & HOSPITAL COURSE    This  is a 21-year-old female with a past medical history significant for mood disorder on Lamictal presented to the ER on 7/7/2022 after she had noted to have throat pain.  He stated that he has been having throat pain for the last 5 days associated with fever, chills, nausea    She was initially involved in facility, noted to have 3.3 cm peritonsillar abscess, transferred here for ENT consultation.  At the outlying facility, she was started on broad-spectrum antibiotics including vancomycin and Zosyn.  During the stay in the hospital, ENT was consulted, patient underwent tonsillectomy on 7/8/2022.  It is noted that patient had activity tonsillitis of the right tonsil, edematous infected left tonsil without defined abscess.    During the stay in hospital, patient continued to remain on IV antibiotics and Unasyn.  ENT continue to follow the patient. ENT recs to discharge patient on Po abx Augmentin 875/125 1 tablet p.o. twice daily.  Patient was discharged on oxycodone, Tylenol, ibuprofen.  She will follow-up with ENT as an outpatient.    At this time, she has been medically stable to be discharged home.    Therefore, she is discharged in fair and stable condition to home with close outpatient follow-up.    The patient met 2-midnight criteria for an inpatient stay at the time of discharge.    Discharge Date  7/9/2022    FOLLOW UP ITEMS POST DISCHARGE  No primary care provider on file.  Please follow up with ENT Dr Melendez as an op    DISCHARGE DIAGNOSES  Principal Problem:    Peritonsillar abscess POA: Yes  Active Problems:    Bipolar disorder (HCC) POA: Unknown    Sepsis (HCC) POA: Unknown  Resolved Problems:    * No resolved hospital problems. *      FOLLOW UP  No future appointments.  Danyell GODOY  PEPE Melendez  [address] [facility] [address]  [phone]    Schedule an appointment as soon as possible for a visit in 1 month(s)        MEDICATIONS ON DISCHARGE     Medication List      START taking these medications      Instructions   acetaminophen 500 MG Tabs  Commonly known as: TYLENOL   Take 1-2 Tablets by mouth every 6 hours as needed for Mild Pain for up to 7 days.  Dose: 500-1,000 mg     amoxicillin-clavulanate 875-125 MG Tabs  Commonly known as: AUGMENTIN   Take 1 Tablet by mouth 2 times a day for 7 days.  Dose: 1 Tablet     ibuprofen 600 MG Tabs  Commonly known as: MOTRIN   Take 1 Tablet by mouth every 6 hours as needed for Moderate Pain for up to 7 days.  Dose: 600 mg     omeprazole 20 MG delayed-release capsule  Commonly known as: PRILOSEC   Take 1 Capsule by mouth every day.  Dose: 20 mg     oxyCODONE immediate release 10 MG immediate release tablet  Commonly known as: ROXICODONE   Take 1 Tablet by mouth every 6 hours as needed for Severe Pain for up to 5 days.  Dose: 10 mg     polyethylene glycol/lytes 17 g Pack  Commonly known as: MIRALAX   Mix 1 Packet in 4-8oz liquid and drink  1 time a day as needed (if sennosides and docusate ineffective after 24 hours).  Dose: 17 g     senna-docusate 8.6-50 MG Tabs  Commonly known as: PERICOLACE or SENOKOT S   Take 2 Tablets by mouth 2 times a day.  Dose: 2 Tablet        CONTINUE taking these medications      Instructions   lamoTRIgine 25 MG Tabs  Commonly known as: LAMICTAL   Take 25 mg by mouth every day.  Dose: 25 mg     MELATONIN PO   Take 1 Tablet by mouth at bedtime as needed (Sleep).  Dose: 1 Tablet     vitamin D3 5000 Unit (125 mcg) Tabs  Commonly known as: cholecalciferol   Take 1,000 Units by mouth every day.  Dose: 1,000 Units        STOP taking these medications    metroNIDAZOLE 500 MG Tabs  Commonly known as: FLAGYL            Allergies  No Known Allergies    DIET  Orders Placed This Encounter   Procedures   • Diet Order Diet: Level  4 - Pureed; Liquid level: Level 0 - Thin     Standing Status:   Standing     Number of Occurrences:   1     Order Specific Question:   Diet:     Answer:   Level 4 - Pureed [25]     Order Specific Question:   Liquid level     Answer:   Level 0 - Thin       ACTIVITY  As tolerated.  Weight bearing as tolerated    CONSULTATIONS  ent    PROCEDURES    TONSILLECTOMY - Wound Class: Dirty or Infected  LABORATORY  Lab Results   Component Value Date    SODIUM 139 07/09/2022    POTASSIUM 4.0 07/09/2022    CHLORIDE 104 07/09/2022    CO2 23 07/09/2022    GLUCOSE 140 (H) 07/09/2022    BUN 4 (L) 07/09/2022    CREATININE 0.50 07/09/2022        Lab Results   Component Value Date    WBC 9.4 07/09/2022    HEMOGLOBIN 11.0 (L) 07/09/2022    HEMATOCRIT 32.7 (L) 07/09/2022    PLATELETCT 168 07/09/2022        Total time of the discharge process exceeds 35minutes.

## 2022-07-09 NOTE — ANESTHESIA PREPROCEDURE EVALUATION
Case: [id number] Date/Time: 07/08/22 1640    Procedures:       TONSILLECTOMY      INCISION AND DRAINAGE PERITONSIL ABSCESS    Location: [facility] OR 10 / SURGERY [facility]    Surgeons: Danyell Melendez M.D.          Relevant Problems   No relevant active problems       Physical Exam    Airway   Mallampati: II  TM distance: >3 FB  Neck ROM: full       Cardiovascular - normal exam  Rhythm: regular  Rate: normal  (-) murmur     Dental - normal exam           Pulmonary - normal exam  Breath sounds clear to auscultation     Abdominal    Neurological - normal exam                 Anesthesia Plan    ASA 2       Plan - general       Airway plan will be ETT          Induction: intravenous    Postoperative Plan: Postoperative administration of opioids is intended.    Pertinent diagnostic labs and testing reviewed    Informed Consent:    Anesthetic plan and risks discussed with patient.    Use of blood products discussed with: patient whom consented to blood products.

## 2022-07-09 NOTE — OR SURGEON
Immediate Post OP Note    PreOp Diagnosis: Left peritonsillar phlegmon      PostOp Diagnosis: Left tonsillar abscess      Procedure(s):  TONSILLECTOMY - Wound Class: Dirty or Infected    Surgeon(s):  Danyell Melendez M.D.    Anesthesiologist/Type of Anesthesia:  Anesthesiologist: Adam Narayanan M.D./General    Surgical Staff:  Circulator: Vee Reid  Scrub Person: Veronicasapna BautistaJose    Specimens removed if any:  ID Type Source Tests Collected by Time Destination   1 : Left Tonsil Body Fluid Tonsil AEROBIC/ANAEROBIC CULTURE (SURGERY) Danyell Melendez M.D. 7/8/2022  6:32 PM    A : Right Tonsil Tissue Tonsil PATHOLOGY SPECIMEN Danyell Melendez M.D. 7/8/2022  6:14 PM    B : Left Tonsil Tissue Tonsil PATHOLOGY SPECIMEN Danyell Melendez M.D. 7/8/2022  6:18 PM        Estimated Blood Loss: 150ml    Findings: Edematous infected left tonsil without defined abscess    Complications: None        7/8/2022 6:51 PM Danyell Melendez M.D.

## 2022-07-09 NOTE — CARE PLAN
The patient is Stable - Low risk of patient condition declining or worsening    Shift Goals  Clinical Goals: Pain control, discharge  Patient Goals: Discharge plan  Family Goals: Comfort    Progress made toward(s) clinical / shift goals:    Problem: Pain - Standard  Goal: Alleviation of pain or a reduction in pain to the patient’s comfort goal  Outcome: Progressing  Note:    Administering pain mediations as ordered (see MAR). Providing patient with cold packs, and repositioning as needed.         Problem: Knowledge Deficit - Standard  Goal: Patient and family/care givers will demonstrate understanding of plan of care, disease process/condition, diagnostic tests and medications  Outcome: Progressing  Note:   Discussed POC with patient; pain management, tolerate ordered diet, decrease swelling with ice packs, and discharge planning once medically cleared to go home. Patient verbalized understanding and is agreeable to POC. Encouraging pt to voice questions and concerns. Oriented pt to use of call light. Patient is capable of making needs known.        Patient is not progressing towards the following goals:

## 2022-07-09 NOTE — OP REPORT
PreOp Diagnosis: Left peritonsillar phlegmon      PostOp Diagnosis: Left tonsillar abscess      Procedure(s):  TONSILLECTOMY - Wound Class: Dirty or Infected    Surgeon(s):  Danyell Melendez M.D.    Anesthesiologist/Type of Anesthesia:  Anesthesiologist: Adam Narayanan M.D./General    Indication:  Non-resolving left tonsil infection, history of recurrent strep tonsillitis, sepsis.     Surgical Staff:  Circulator: Vee Reid  Scrub Person: Veronica Garcia    Specimens removed if any:  ID Type Source Tests Collected by Time Destination   1 : Left Tonsil Body Fluid Tonsil AEROBIC/ANAEROBIC CULTURE (SURGERY) Danyell Melendez M.D. 7/8/2022  6:32 PM    A : Right Tonsil Tissue Tonsil PATHOLOGY SPECIMEN Danyell Melendez M.D. 7/8/2022  6:14 PM    B : Left Tonsil Tissue Tonsil PATHOLOGY SPECIMEN Danyell Melendez M.D. 7/8/2022  6:18 PM        Estimated Blood Loss: 150ml    Findings: Exudative tonsillitis of the right tonsil.  Edematous infected left tonsil without defined abscess    Complications: None    Procedure in Detail: The patient was positively identified in the preop area and informed consent was obtained. The left side was marked.  The patient was brought to the operating room and placed in a supine position on the OR table. General anesthesia was induced and they were endotracheally intubated.  The table was turned 90 degrees. The patient was draped in the standard fashion. A timeout procedure was completed.     A headdrape and mouth gag were inserted and the patient was placed in suspension from the [place] stand. The right tonsil was grasped and retracted medially. Electrocautery was used to remove the tonsil from the tonsillar fossa.  Suction cautery was used for hemostasis.  This was repeated on the left.  The left tonsil was friable, vascular, and highly edematous.  It was removed in multiple pieces.  No abscess cavity was identified.  Palpation after removal demonstrated no residual edema or  swelling.  Left tonsil was swabbed after it was removed.  Tonsils were sent separately for pathology. Hemostasis was adequate with Valsalva and letting down the tonsil gag. The field was copiously irrigated and found to be hemostatic.  The mouth gag and head drape were removed. The patient was returned to anesthesia for emergence. All counts were correct.     Disposition: Readmit to floor for observation

## 2022-07-09 NOTE — DISCHARGE INSTRUCTIONS
Tonsillectomy Post-op Instructions     Medications  It is important to take the pain medication on a scheduled basis for at least the first week.  Continue taking ibuprofen and Tylenol as scheduled until you are not having any pain.  Most patients do better if they “stay ahead” of the pain.  Your body will get used to the effects of narcotic pain medications and they can cause constipation and sedation.  They should be used only as necessary and in combination with Tylenol and ibuprofen to maximize pain control and minimize side effects.  If the pain medicine you were prescribed is not working, please call during normal business hours.  Below is the recommended schedule for taking these medications. You should alternate the Tylenol and ibuprofen so that you are taking one of the other every 3 hours.    Tylenol 650mg every 6 hours   Ibuprofen 600mg every 6 hours   Oxycodone 5 mg every 4 hours as needed for severe pain only    Postoperative Pain  All patients have pain after a tonsillectomy, but everyone’s response to pain and pain level is different.  Children tend to have less pain and typically will require pain medicines for 7 days.  Adults tend to have pain for 10-14 days.  The pain can be lessened by staying well hydrated.  If in doubt, drink more water--it is almost impossible to be over-hydrated after tonsillectomy.  In children, it may take some “tough love” to stay sufficiently hydrated, especially for the first few days.  Additionally many patients find that cold food/drinks help reduce pain.  Avoid alcohol or citrus/acidic foods as these will burn.    Diet  It is important to eat a soft diet (no sharp or crunchy foods) for the first two weeks after surgery.  Start slowly, and don’t be surprised if children don’t want to eat anything for the first few days after surgery.  As long as they are staying well hydrated, this is not a concern.  It is better to stay hydrated than to get dehydrated and try to catch  up afterwards.  Please seek medical care if dehydration is a concern.      Bleeding  Approximately 3-5% of patients will have postoperative bleeding.  This usually occurs either on the day of surgery or 7-10 days later when the scab falls off.  If this occurs, seek medical care immediately at the nearest hospital.  Even if the bleeding stops on its own you need to be evaluated by a physician.  In adults, bleeding can usually be controlled with cautery at the bedside, in children this typically needs to be done in the operating room.    Other Concerns  Avoid heavy lifting or strenuous activities for 2 weeks after surgery.  In adults, we recommend 2 weeks off of work.    Foul-smelling breath is a normal part of the healing process and lasts for 1-2 weeks.  It is ok to brush your teeth normally but avoid alcohol-containing mouthwashes such as Listerine.    The area where the tonsils were will appear gray or filmy for 1-2 weeks after surgery.  This is normal and does not represent a throat infection.  Earache is normal and does not represent an ear infection.  Avoid vigorous throat clearing or coughing.  It will often feel like you need to clear your throat or that there is something stuck in the back of your throat, but this is part of the healing response.  If the adenoids were also removed, nasal congestion or snoring are expected for the first week or so.  Low grade fevers are expected and can be treated with Tylenol.  High fevers are not normal (>101.5).  If this occurs seek medical attention.  Productive cough or difficulty breathing are not normal, seek medical attention.  Neck stiffness/rigidity is not normal, seek medical attention.    Follow-up  Please call [phone] to schedule a follow-up appointment with Dr. Melendez for 4 weeks postop.  For prescription refills or routine concerns, please call during office hours: Monday-Friday: 8AM - 5 PM      Discharge Instructions    Discharged to home by car with  relative. Discharged via wheelchair, hospital escort: Yes.  Special equipment needed: Not Applicable    Be sure to schedule a follow-up appointment with your primary care doctor or any specialists as instructed.     Discharge Plan:   Diet Plan: Discussed  Activity Level: Discussed  Confirmed Follow up Appointment: Patient to Call and Schedule Appointment  Confirmed Symptoms Management: Discussed  Medication Reconciliation Updated: Yes    I understand that a diet low in cholesterol, fat, and sodium is recommended for good health. Unless I have been given specific instructions below for another diet, I accept this instruction as my diet prescription.   Other diet: Puree    Special Instructions: None    -Is this patient being discharged with medication to prevent blood clots?  No    Is patient discharged on Warfarin / Coumadin?   No     Antibiotic Medicine, Adult    Antibiotic medicines treat infections caused by a type of germ called bacteria. They work by killing the bacteria that make you sick.  When do I need to take antibiotics?  You often need these medicines to treat bacterial infections, such as:  A urinary tract infection (UTI).  Strep throat.  Meningitis. This affects the spinal cord and brain.  A bad lung infection.  You may start the medicines while your doctor waits for tests to come back. When the tests come back, your doctor may change or stop your medicine.  When are antibiotics not needed?  You do not need these medicines for most common illnesses, such as:  A cold.  The flu.  A sore throat.  Antibiotics are not always needed for all infections caused by bacteria. Do not ask for these medicines, or take them, when they are not needed.  What are the risks of taking antibiotics?  Most antibiotics can cause an infection called Clostridioides difficile (C. diff). This causes watery poop (diarrhea). Let your doctor know right away if:  You have watery poop while taking an antibiotic.  You have watery poop  after you stop taking an antibiotic. The illness can happen weeks after you stop the medicine.  You also have a risk of getting an infection in the future that antibiotics cannot treat (antibiotic-resistant infection). This type of infection can be dangerous.  What else should I know about taking antibiotics?    You need to take the entire prescription.  Take the medicine for as long as told by your doctor.  Do not stop taking it even if you start to feel better.  Try not to miss any doses. If you miss a dose, call your doctor.  Birth control pills may not work. If you take birth control pills:  Keep on taking them.  Use a second form of birth control, such as a condom. Do this for as long as told by your doctor.  Ask your doctor:  How long to wait in between doses.  If you should take the medicine with food.  If there is anything you should stay away from while taking the antibiotic, such as:  Food.  Drinks.  Medicines.  If there are any side effects you should watch for.  Only take the medicines that your doctor told you to take. Do not take medicines that were given to someone else.  Drink a large glass of water with the medicine.  Ask the pharmacist for a tool to measure the medicine, such as:  A syringe.  A cup.  A spoon.  Throw away any extra medicine.  Contact a doctor if:  You get worse.  You have new joint pain or muscle aches after starting the medicine.  You have side effects from the medicine, such as:  Stomach pain.  Watery poop.  Feeling sick to your stomach (nausea).  Get help right away if:  You have signs of a very bad allergic reaction. If this happens, stop taking the medicine right away. Signs may include:  Hives. These are raised, itchy, red bumps on the skin.  Skin rash.  Trouble breathing.  Wheezing.  Swelling.  Feeling dizzy.  Throwing up (vomiting).  Your pee (urine) is dark, or is the color of blood.  Your skin turns yellow.  You bruise easily.  You bleed easily.  You have very bad watery  poop and cramps in your belly.  You have a very bad headache.  Summary  Antibiotics are often used to treat infections caused by bacteria.  Only take these medicines when needed.  Let your doctor know if you have watery poop while taking an antibiotic.  You need to take the entire prescription.  This information is not intended to replace advice given to you by your health care provider. Make sure you discuss any questions you have with your health care provider.  Document Released: 09/26/2009 Document Revised: 01/24/2020 Document Reviewed: 12/20/2017  Elsevier Patient Education © 2020 [employer]  Dysphagia Eating Plan, Pureed  This diet is helpful for people with moderate to severe swallowing problems. Pureed foods are smooth and are prepared without lumps so that they can be swallowed safely. Work with your health care provider and your diet and nutrition specialist (dietitian) to make sure that you are following the diet safely and getting all the nutrients you need.  What are tips for following this plan?  General instructions  You may eat foods that are soft and have a pudding-like texture.  Do not eat foods that you have to chew. If you have to chew the food, then you cannot eat it.  Avoid foods that are hard, dry, sticky, chunky, lumpy, or stringy. Also avoid foods with nuts, seeds, raisins, skins, or pulp.  You may be instructed to thicken liquids. Follow your health care provider's instructions about how to do this and to what consistency.  Cooking    If a food is not originally a smooth texture, you may be able to eat the food after:  Pureeing it. This can be done with a .  Moistening it. This can be done by adding juice, cooking liquid, gravy, or sauce to a dry food and then pureeing it. For example, you may have bread if you soak it in milk and puree it.  If a food is too thin, you may add a commercial thickener, corn starch, rice cereal, or potato flakes to thicken it.  Strain and throw away  any liquid that separates from a solid pureed food before eating.  Strain lumps, chunks, pulp, and seeds from pureed foods before eating.  Reheat foods slowly to prevent a tough crust from forming.  Meal planning  Eat a variety of foods to get all the nutrients you need.  Add dry milk or protein powder to food to increase calories and protein content.  Follow your meal plan as told by your dietitian.  What foods are allowed?  The items listed may not be a complete list. Talk with your dietitian about what dietary choices are best for you.  Grains  Soft breads, pancakes, [place] toast, muffins, and bread stuffing pureed to a smooth, moist texture, without nuts or seeds. Cooked cereals that have a pudding-like consistency, such as cream of wheat or farina. Pureed oatmeal. Pureed, well-cooked pasta and rice.  Vegetables  Pureed vegetables. Smooth tomato paste or sauce. Mashed or pureed potatoes without skin.  Fruits  Pureed fruits such as melons and apples without seeds or pulp. Mashed bananas. Mashed avocado. Fruit juices without pulp or seeds.  Meats and other protein foods  Pureed meat, poultry, and fish. Smooth morgan or liverwurst. Smooth souffles. Pureed beans (such as lentils). Pureed eggs. Smooth nut and seed butters. Pureed tofu.  Dairy  Yogurt. Milk. Pureed cottage cheese. Nutritional dairy drinks or shakes. Cream cheese. Smooth pudding, ice cream, sherbet, and malts.  Fats and oils  Butter. Margarine. Vegetable oils. Smooth and strained gravy. Sour cream. Mayonnaise. Smooth sauces such as white sauce, cheese sauce, or hollandaise sauce.  Sweets and desserts  Moistened and pureed cookies and cakes. Whipped topping. Gelatin. Pudding pops.  Seasoning and other foods  Finely ground spices. Jelly. Honey. Pureed casseroles. Strained soups. Pureed sandwiches.  Beverages  Anything prepared at the consistency recommended by your dietitian.  What foods are not allowed?  The items listed may not be a complete list. Talk  with your dietitian about what dietary choices are best for you.  Grains  Oatmeal. Dry cereals. Hard breads. Breads with seeds or nuts. Whole pasta, rice, or other grains. Whole pancakes, waffles, biscuits, muffins, or rolls.  Vegetables  Whole vegetables. Stringy vegetables (such as celery). Tomatoes or tomato sauce with seeds. Fried vegetables.  Fruits  Whole fresh, frozen, canned, or dried fruits that have not been pureed. Stringy fruits, such as pineapple or coconut. Watermelon with seeds. Dried fruit or fruit leather.  Meat and other protein foods  Whole or ground meat, fish, or poultry. Dried or cooked lentils or legumes that have been cooked but not mashed or pureed. Non-pureed eggs. Nuts and seeds. Crunchy peanut butter. Whole tofu or other meat alternatives.  Dairy  Cheese cubes or slices. Non-pureed cottage cheese. Yogurt with fruit chunks.  Fats and oils  All fats and sauces that have lumps or chunks.  Sweets and desserts  Solid desserts. Sticky, chewy sweets (such as licorice and caramel). Candy with nuts or coconut.  Seasoning and other foods  Coarse or seeded herbs and spices. [place] preserves. Jams with seeds. Whole sandwiches. Non-pureed casseroles. [place] soups.  Summary  Pureed foods can be helpful for people with moderate to severe swallowing problems.  On the dysphagia eating plan, you may eat foods that are soft and have a pudding-like texture. You should avoid foods that you have to chew. If you have to chew the food, then you cannot eat it.  You may be instructed to thicken liquids. Follow your health care provider's instructions about how to do this and to what consistency.  This information is not intended to replace advice given to you by your health care provider. Make sure you discuss any questions you have with your health care provider.  Document Released: 12/18/2006 Document Revised: 04/09/2020 Document Reviewed: 02/20/2018  Elsevier Patient Education © 2020 Elsevier Inc.

## 2022-07-09 NOTE — PROGRESS NOTES
HD 3, POD 1    24 hr events:  OR yesterday for tonsillectomy for left tonsillar phlegmon  S: Pain improved from preop, not too bad, APAP/motrin helps, no bleeding, drinking OK    O: AF, VSS  Alert, NAD  OC/OP: expected eschars bilaterally, no significant erythema, no trismus    Labs: WBC nl    A/P: POD 1 tonsillectomy for left tonsillar phlegmon  -significantly improved  -continue ibuprofen 600 mg q6 H x 14 days, APAP 650 mg Q6H x 14 days, Oxycodone 5 mg Q4H PRN severe pain, senna BID x 10 days, and Augmentin x 5 more days  -FU PRN my office  -DE Home

## 2022-07-09 NOTE — ANESTHESIA TIME REPORT
Anesthesia Start and Stop Event Times     Date Time Event    7/8/2022 1732 Ready for Procedure     1745 Anesthesia Start     1854 Anesthesia Stop        Responsible Staff  07/08/22    Name Role Begin End    Adam Narayanan M.D. Anesth 1745 1854        Overtime Reason:  no overtime (within assigned shift)    Comments:

## 2022-07-09 NOTE — PROGRESS NOTES
Went over discharge instructions with patient, spouse at bedside; patient is agreeable to plan. Patient states no further questions. IV removed. Patient provided information on tonsillectomy, puree diet, antibiotic use, and provided Krames print out. Patient walked out with spouse, patient and spouse state all belongings in possession.

## 2022-07-09 NOTE — DISCHARGE PLANNING
Case Management Discharge Planning    Admission Date: 7/6/2022  GMLOS: 3.5  ALOS: 1    6-Clicks ADL Score: 24  6-Clicks Mobility Score: 22      Anticipated Discharge Dispo: Discharge Disposition: Discharged to home/self care (01)    DME Needed: No    Action(s) Taken: Met with patient at bedside, discussed plan for discharge home today. Her boyfriend will drive, patient is independent and ambulatory, no case management needs.       Escalations Completed: None    Medically Clear: Yes    Next Steps: DC instructions and transport home.     Barriers to Discharge: None

## 2022-07-09 NOTE — ANESTHESIA PROCEDURE NOTES
Airway    Date/Time: 7/8/2022 5:52 PM  Performed by: Adam Narayanan M.D.  Authorized by: Adam Narayanan M.D.     Location:  OR  Urgency:  Elective  Indications for Airway Management:  Anesthesia      Spontaneous Ventilation: absent    Sedation Level:  Deep  Preoxygenated: Yes    Patient Position:  Sniffing  Final Airway Type:  Endotracheal airway  Final Endotracheal Airway:  ETT  Cuffed: Yes    Technique Used for Successful ETT Placement:  Direct laryngoscopy    Insertion Site:  Oral  Blade Type:  Tessie  Laryngoscope Blade/Videolaryngoscope Blade Size:  3  ETT Size (mm):  7.0  Measured from:  Lips  ETT to Lips (cm):  21  Placement Verified by: auscultation and capnometry    Cormack-Lehane Classification:  Grade I - full view of glottis  Number of Attempts at Approach:  1

## 2022-07-11 LAB
BACTERIA SPEC ANAEROBE CULT: NORMAL
BACTERIA WND AEROBE CULT: ABNORMAL
GRAM STN SPEC: ABNORMAL
PATHOLOGY CONSULT NOTE: NORMAL
SIGNIFICANT IND 70042: ABNORMAL
SIGNIFICANT IND 70042: NORMAL
SITE SITE: ABNORMAL
SITE SITE: NORMAL
SOURCE SOURCE: ABNORMAL
SOURCE SOURCE: NORMAL

## 2022-07-14 ENCOUNTER — HOSPITAL ENCOUNTER (OUTPATIENT)
Facility: MEDICAL CENTER | Age: 22
End: 2022-07-15
Attending: EMERGENCY MEDICINE | Admitting: OTOLARYNGOLOGY
Payer: COMMERCIAL

## 2022-07-14 ENCOUNTER — ANESTHESIA EVENT (OUTPATIENT)
Dept: SURGERY | Facility: MEDICAL CENTER | Age: 22
End: 2022-07-14
Payer: COMMERCIAL

## 2022-07-14 ENCOUNTER — ANESTHESIA (OUTPATIENT)
Dept: SURGERY | Facility: MEDICAL CENTER | Age: 22
End: 2022-07-14
Payer: COMMERCIAL

## 2022-07-14 ENCOUNTER — APPOINTMENT (OUTPATIENT)
Dept: RADIOLOGY | Facility: MEDICAL CENTER | Age: 22
End: 2022-07-14
Attending: OTOLARYNGOLOGY
Payer: COMMERCIAL

## 2022-07-14 DIAGNOSIS — G89.18 POSTOPERATIVE PAIN: ICD-10-CM

## 2022-07-14 DIAGNOSIS — J95.830 POST-TONSILLECTOMY HEMORRHAGE: ICD-10-CM

## 2022-07-14 DIAGNOSIS — J36 PERITONSILLAR ABSCESS: ICD-10-CM

## 2022-07-14 LAB
ANION GAP SERPL CALC-SCNC: 9 MMOL/L (ref 7–16)
BASOPHILS # BLD AUTO: 0 % (ref 0–1.8)
BASOPHILS # BLD: 0 K/UL (ref 0–0.12)
BUN SERPL-MCNC: 5 MG/DL (ref 8–22)
CALCIUM SERPL-MCNC: 8.1 MG/DL (ref 8.5–10.5)
CHLORIDE SERPL-SCNC: 107 MMOL/L (ref 96–112)
CO2 SERPL-SCNC: 24 MMOL/L (ref 20–33)
CREAT SERPL-MCNC: 0.53 MG/DL (ref 0.5–1.4)
EOSINOPHIL # BLD AUTO: 0 K/UL (ref 0–0.51)
EOSINOPHIL NFR BLD: 0 % (ref 0–6.9)
ERYTHROCYTE [DISTWIDTH] IN BLOOD BY AUTOMATED COUNT: 42.6 FL (ref 35.9–50)
GFR SERPLBLD CREATININE-BSD FMLA CKD-EPI: 134 ML/MIN/1.73 M 2
GLUCOSE SERPL-MCNC: 99 MG/DL (ref 65–99)
HCG UR QL: NEGATIVE
HCT VFR BLD AUTO: 35.5 % (ref 37–47)
HGB BLD-MCNC: 11.7 G/DL (ref 12–16)
LYMPHOCYTES # BLD AUTO: 2.07 K/UL (ref 1–4.8)
LYMPHOCYTES NFR BLD: 13 % (ref 22–41)
MANUAL DIFF BLD: NORMAL
MCH RBC QN AUTO: 30.4 PG (ref 27–33)
MCHC RBC AUTO-ENTMCNC: 33 G/DL (ref 33.6–35)
MCV RBC AUTO: 92.2 FL (ref 81.4–97.8)
METAMYELOCYTES NFR BLD MANUAL: 0.9 %
MONOCYTES # BLD AUTO: 0.41 K/UL (ref 0–0.85)
MONOCYTES NFR BLD AUTO: 2.6 % (ref 0–13.4)
MORPHOLOGY BLD-IMP: NORMAL
NEUTROPHILS # BLD AUTO: 13.28 K/UL (ref 2–7.15)
NEUTROPHILS NFR BLD: 83.5 % (ref 44–72)
NRBC # BLD AUTO: 0 K/UL
NRBC BLD-RTO: 0 /100 WBC
PLATELET # BLD AUTO: 327 K/UL (ref 164–446)
PLATELET BLD QL SMEAR: NORMAL
PMV BLD AUTO: 10.4 FL (ref 9–12.9)
POTASSIUM SERPL-SCNC: 3.8 MMOL/L (ref 3.6–5.5)
RBC # BLD AUTO: 3.85 M/UL (ref 4.2–5.4)
RBC BLD AUTO: NORMAL
SODIUM SERPL-SCNC: 140 MMOL/L (ref 135–145)
WBC # BLD AUTO: 15.9 K/UL (ref 4.8–10.8)

## 2022-07-14 PROCEDURE — G0378 HOSPITAL OBSERVATION PER HR: HCPCS

## 2022-07-14 PROCEDURE — 700111 HCHG RX REV CODE 636 W/ 250 OVERRIDE (IP): Performed by: EMERGENCY MEDICINE

## 2022-07-14 PROCEDURE — A9270 NON-COVERED ITEM OR SERVICE: HCPCS | Performed by: OTOLARYNGOLOGY

## 2022-07-14 PROCEDURE — 700105 HCHG RX REV CODE 258: Performed by: ANESTHESIOLOGY

## 2022-07-14 PROCEDURE — 160009 HCHG ANES TIME/MIN: Performed by: OTOLARYNGOLOGY

## 2022-07-14 PROCEDURE — 85025 COMPLETE CBC W/AUTO DIFF WBC: CPT

## 2022-07-14 PROCEDURE — 160002 HCHG RECOVERY MINUTES (STAT): Performed by: OTOLARYNGOLOGY

## 2022-07-14 PROCEDURE — 700105 HCHG RX REV CODE 258: Performed by: OTOLARYNGOLOGY

## 2022-07-14 PROCEDURE — 160027 HCHG SURGERY MINUTES - 1ST 30 MINS LEVEL 2: Performed by: OTOLARYNGOLOGY

## 2022-07-14 PROCEDURE — 85007 BL SMEAR W/DIFF WBC COUNT: CPT

## 2022-07-14 PROCEDURE — 80048 BASIC METABOLIC PNL TOTAL CA: CPT

## 2022-07-14 PROCEDURE — A9270 NON-COVERED ITEM OR SERVICE: HCPCS | Performed by: ANESTHESIOLOGY

## 2022-07-14 PROCEDURE — 81025 URINE PREGNANCY TEST: CPT

## 2022-07-14 PROCEDURE — 160035 HCHG PACU - 1ST 60 MINS PHASE I: Performed by: OTOLARYNGOLOGY

## 2022-07-14 PROCEDURE — 96374 THER/PROPH/DIAG INJ IV PUSH: CPT

## 2022-07-14 PROCEDURE — 160048 HCHG OR STATISTICAL LEVEL 1-5: Performed by: OTOLARYNGOLOGY

## 2022-07-14 PROCEDURE — 700105 HCHG RX REV CODE 258: Performed by: EMERGENCY MEDICINE

## 2022-07-14 PROCEDURE — 96375 TX/PRO/DX INJ NEW DRUG ADDON: CPT

## 2022-07-14 PROCEDURE — 00170 ANES INTRAORAL PX NOS: CPT | Performed by: ANESTHESIOLOGY

## 2022-07-14 PROCEDURE — 700111 HCHG RX REV CODE 636 W/ 250 OVERRIDE (IP): Performed by: ANESTHESIOLOGY

## 2022-07-14 PROCEDURE — 71045 X-RAY EXAM CHEST 1 VIEW: CPT

## 2022-07-14 PROCEDURE — 99285 EMERGENCY DEPT VISIT HI MDM: CPT

## 2022-07-14 PROCEDURE — 36415 COLL VENOUS BLD VENIPUNCTURE: CPT

## 2022-07-14 PROCEDURE — 700101 HCHG RX REV CODE 250: Performed by: OTOLARYNGOLOGY

## 2022-07-14 PROCEDURE — 160036 HCHG PACU - EA ADDL 30 MINS PHASE I: Performed by: OTOLARYNGOLOGY

## 2022-07-14 PROCEDURE — 700102 HCHG RX REV CODE 250 W/ 637 OVERRIDE(OP): Performed by: ANESTHESIOLOGY

## 2022-07-14 PROCEDURE — 700101 HCHG RX REV CODE 250: Performed by: ANESTHESIOLOGY

## 2022-07-14 PROCEDURE — 160038 HCHG SURGERY MINUTES - EA ADDL 1 MIN LEVEL 2: Performed by: OTOLARYNGOLOGY

## 2022-07-14 PROCEDURE — 700102 HCHG RX REV CODE 250 W/ 637 OVERRIDE(OP): Performed by: OTOLARYNGOLOGY

## 2022-07-14 RX ORDER — DEXAMETHASONE SODIUM PHOSPHATE 4 MG/ML
INJECTION, SOLUTION INTRA-ARTICULAR; INTRALESIONAL; INTRAMUSCULAR; INTRAVENOUS; SOFT TISSUE PRN
Status: DISCONTINUED | OUTPATIENT
Start: 2022-07-14 | End: 2022-07-14 | Stop reason: SURG

## 2022-07-14 RX ORDER — AMOXICILLIN 250 MG
2 CAPSULE ORAL 2 TIMES DAILY
Status: DISCONTINUED | OUTPATIENT
Start: 2022-07-14 | End: 2022-07-15 | Stop reason: HOSPADM

## 2022-07-14 RX ORDER — HALOPERIDOL 5 MG/ML
1 INJECTION INTRAMUSCULAR
Status: DISCONTINUED | OUTPATIENT
Start: 2022-07-14 | End: 2022-07-14 | Stop reason: HOSPADM

## 2022-07-14 RX ORDER — MORPHINE SULFATE 4 MG/ML
4 INJECTION INTRAVENOUS ONCE
Status: COMPLETED | OUTPATIENT
Start: 2022-07-14 | End: 2022-07-14

## 2022-07-14 RX ORDER — POLYETHYLENE GLYCOL 3350 17 G/17G
1 POWDER, FOR SOLUTION ORAL
Status: DISCONTINUED | OUTPATIENT
Start: 2022-07-14 | End: 2022-07-15 | Stop reason: HOSPADM

## 2022-07-14 RX ORDER — ONDANSETRON 4 MG/1
4 TABLET, ORALLY DISINTEGRATING ORAL EVERY 4 HOURS PRN
Status: DISCONTINUED | OUTPATIENT
Start: 2022-07-14 | End: 2022-07-15 | Stop reason: HOSPADM

## 2022-07-14 RX ORDER — PROCHLORPERAZINE EDISYLATE 5 MG/ML
5-10 INJECTION INTRAMUSCULAR; INTRAVENOUS EVERY 4 HOURS PRN
Status: DISCONTINUED | OUTPATIENT
Start: 2022-07-14 | End: 2022-07-15 | Stop reason: HOSPADM

## 2022-07-14 RX ORDER — HYDROMORPHONE HYDROCHLORIDE 1 MG/ML
0.4 INJECTION, SOLUTION INTRAMUSCULAR; INTRAVENOUS; SUBCUTANEOUS
Status: DISCONTINUED | OUTPATIENT
Start: 2022-07-14 | End: 2022-07-14 | Stop reason: HOSPADM

## 2022-07-14 RX ORDER — SODIUM CHLORIDE, SODIUM LACTATE, POTASSIUM CHLORIDE, CALCIUM CHLORIDE 600; 310; 30; 20 MG/100ML; MG/100ML; MG/100ML; MG/100ML
INJECTION, SOLUTION INTRAVENOUS CONTINUOUS
Status: DISCONTINUED | OUTPATIENT
Start: 2022-07-14 | End: 2022-07-14 | Stop reason: HOSPADM

## 2022-07-14 RX ORDER — OXYCODONE HYDROCHLORIDE 10 MG/1
10 TABLET ORAL EVERY 4 HOURS PRN
Status: DISCONTINUED | OUTPATIENT
Start: 2022-07-14 | End: 2022-07-15 | Stop reason: HOSPADM

## 2022-07-14 RX ORDER — KETOROLAC TROMETHAMINE 30 MG/ML
15 INJECTION, SOLUTION INTRAMUSCULAR; INTRAVENOUS
Status: DISCONTINUED | OUTPATIENT
Start: 2022-07-14 | End: 2022-07-14

## 2022-07-14 RX ORDER — BISACODYL 10 MG
10 SUPPOSITORY, RECTAL RECTAL
Status: DISCONTINUED | OUTPATIENT
Start: 2022-07-14 | End: 2022-07-15 | Stop reason: HOSPADM

## 2022-07-14 RX ORDER — MEPERIDINE HYDROCHLORIDE 25 MG/ML
6.25 INJECTION INTRAMUSCULAR; INTRAVENOUS; SUBCUTANEOUS
Status: DISCONTINUED | OUTPATIENT
Start: 2022-07-14 | End: 2022-07-14 | Stop reason: HOSPADM

## 2022-07-14 RX ORDER — MIDAZOLAM HYDROCHLORIDE 1 MG/ML
INJECTION INTRAMUSCULAR; INTRAVENOUS PRN
Status: DISCONTINUED | OUTPATIENT
Start: 2022-07-14 | End: 2022-07-14 | Stop reason: SURG

## 2022-07-14 RX ORDER — BUPIVACAINE HYDROCHLORIDE 5 MG/ML
INJECTION, SOLUTION EPIDURAL; INTRACAUDAL
Status: DISCONTINUED | OUTPATIENT
Start: 2022-07-14 | End: 2022-07-14 | Stop reason: HOSPADM

## 2022-07-14 RX ORDER — ONDANSETRON 2 MG/ML
4 INJECTION INTRAMUSCULAR; INTRAVENOUS
Status: DISCONTINUED | OUTPATIENT
Start: 2022-07-14 | End: 2022-07-14

## 2022-07-14 RX ORDER — OXYCODONE HCL 5 MG/5 ML
10 SOLUTION, ORAL ORAL
Status: COMPLETED | OUTPATIENT
Start: 2022-07-14 | End: 2022-07-14

## 2022-07-14 RX ORDER — PROMETHAZINE HYDROCHLORIDE 25 MG/1
12.5-25 TABLET ORAL EVERY 4 HOURS PRN
Status: DISCONTINUED | OUTPATIENT
Start: 2022-07-14 | End: 2022-07-15 | Stop reason: HOSPADM

## 2022-07-14 RX ORDER — ONDANSETRON 2 MG/ML
INJECTION INTRAMUSCULAR; INTRAVENOUS PRN
Status: DISCONTINUED | OUTPATIENT
Start: 2022-07-14 | End: 2022-07-14 | Stop reason: SURG

## 2022-07-14 RX ORDER — SODIUM CHLORIDE 9 MG/ML
INJECTION, SOLUTION INTRAVENOUS CONTINUOUS
Status: DISCONTINUED | OUTPATIENT
Start: 2022-07-14 | End: 2022-07-15 | Stop reason: HOSPADM

## 2022-07-14 RX ORDER — HYDROMORPHONE HYDROCHLORIDE 1 MG/ML
0.1 INJECTION, SOLUTION INTRAMUSCULAR; INTRAVENOUS; SUBCUTANEOUS
Status: DISCONTINUED | OUTPATIENT
Start: 2022-07-14 | End: 2022-07-14 | Stop reason: HOSPADM

## 2022-07-14 RX ORDER — SODIUM CHLORIDE, SODIUM LACTATE, POTASSIUM CHLORIDE, CALCIUM CHLORIDE 600; 310; 30; 20 MG/100ML; MG/100ML; MG/100ML; MG/100ML
INJECTION, SOLUTION INTRAVENOUS
Status: DISCONTINUED | OUTPATIENT
Start: 2022-07-14 | End: 2022-07-14 | Stop reason: SURG

## 2022-07-14 RX ORDER — ONDANSETRON 2 MG/ML
4 INJECTION INTRAMUSCULAR; INTRAVENOUS EVERY 4 HOURS PRN
Status: DISCONTINUED | OUTPATIENT
Start: 2022-07-14 | End: 2022-07-15 | Stop reason: HOSPADM

## 2022-07-14 RX ORDER — ONDANSETRON 2 MG/ML
4 INJECTION INTRAMUSCULAR; INTRAVENOUS
Status: DISCONTINUED | OUTPATIENT
Start: 2022-07-14 | End: 2022-07-14 | Stop reason: HOSPADM

## 2022-07-14 RX ORDER — ACETAMINOPHEN 500 MG
1000 TABLET ORAL EVERY 8 HOURS
Status: DISCONTINUED | OUTPATIENT
Start: 2022-07-14 | End: 2022-07-15 | Stop reason: HOSPADM

## 2022-07-14 RX ORDER — DEXAMETHASONE SODIUM PHOSPHATE 10 MG/ML
8 INJECTION, SOLUTION INTRAMUSCULAR; INTRAVENOUS EVERY 8 HOURS
Status: DISCONTINUED | OUTPATIENT
Start: 2022-07-14 | End: 2022-07-15

## 2022-07-14 RX ORDER — ALBUTEROL SULFATE 2.5 MG/3ML
2.5 SOLUTION RESPIRATORY (INHALATION)
Status: DISCONTINUED | OUTPATIENT
Start: 2022-07-14 | End: 2022-07-14 | Stop reason: HOSPADM

## 2022-07-14 RX ORDER — HYDROMORPHONE HYDROCHLORIDE 1 MG/ML
0.2 INJECTION, SOLUTION INTRAMUSCULAR; INTRAVENOUS; SUBCUTANEOUS
Status: DISCONTINUED | OUTPATIENT
Start: 2022-07-14 | End: 2022-07-14 | Stop reason: HOSPADM

## 2022-07-14 RX ORDER — OXYCODONE HCL 5 MG/5 ML
5 SOLUTION, ORAL ORAL
Status: COMPLETED | OUTPATIENT
Start: 2022-07-14 | End: 2022-07-14

## 2022-07-14 RX ORDER — OXYCODONE HYDROCHLORIDE 5 MG/1
5 TABLET ORAL EVERY 4 HOURS PRN
Status: DISCONTINUED | OUTPATIENT
Start: 2022-07-14 | End: 2022-07-15 | Stop reason: HOSPADM

## 2022-07-14 RX ORDER — SODIUM CHLORIDE 9 MG/ML
1000 INJECTION, SOLUTION INTRAVENOUS ONCE
Status: COMPLETED | OUTPATIENT
Start: 2022-07-14 | End: 2022-07-14

## 2022-07-14 RX ORDER — DEXMEDETOMIDINE HYDROCHLORIDE 100 UG/ML
INJECTION, SOLUTION INTRAVENOUS PRN
Status: DISCONTINUED | OUTPATIENT
Start: 2022-07-14 | End: 2022-07-14 | Stop reason: SURG

## 2022-07-14 RX ORDER — DIPHENHYDRAMINE HYDROCHLORIDE 50 MG/ML
12.5 INJECTION INTRAMUSCULAR; INTRAVENOUS
Status: DISCONTINUED | OUTPATIENT
Start: 2022-07-14 | End: 2022-07-14 | Stop reason: HOSPADM

## 2022-07-14 RX ORDER — PROMETHAZINE HYDROCHLORIDE 25 MG/1
12.5-25 SUPPOSITORY RECTAL EVERY 4 HOURS PRN
Status: DISCONTINUED | OUTPATIENT
Start: 2022-07-14 | End: 2022-07-15 | Stop reason: HOSPADM

## 2022-07-14 RX ORDER — ROCURONIUM BROMIDE 10 MG/ML
INJECTION, SOLUTION INTRAVENOUS PRN
Status: DISCONTINUED | OUTPATIENT
Start: 2022-07-14 | End: 2022-07-14 | Stop reason: SURG

## 2022-07-14 RX ADMIN — FENTANYL CITRATE 25 MCG: 50 INJECTION, SOLUTION INTRAMUSCULAR; INTRAVENOUS at 22:51

## 2022-07-14 RX ADMIN — ONDANSETRON 4 MG: 2 INJECTION INTRAMUSCULAR; INTRAVENOUS at 21:42

## 2022-07-14 RX ADMIN — OXYCODONE HYDROCHLORIDE 10 MG: 5 SOLUTION ORAL at 23:15

## 2022-07-14 RX ADMIN — ROCURONIUM BROMIDE 50 MG: 10 INJECTION, SOLUTION INTRAVENOUS at 21:24

## 2022-07-14 RX ADMIN — PROPOFOL 200 MG: 10 INJECTION, EMULSION INTRAVENOUS at 21:24

## 2022-07-14 RX ADMIN — SENNOSIDES AND DOCUSATE SODIUM 2 TABLET: 50; 8.6 TABLET ORAL at 17:32

## 2022-07-14 RX ADMIN — FENTANYL CITRATE 50 MCG: 50 INJECTION, SOLUTION INTRAMUSCULAR; INTRAVENOUS at 21:24

## 2022-07-14 RX ADMIN — SODIUM CHLORIDE 1000 ML: 9 INJECTION, SOLUTION INTRAVENOUS at 11:56

## 2022-07-14 RX ADMIN — SUGAMMADEX 200 MG: 100 INJECTION, SOLUTION INTRAVENOUS at 21:47

## 2022-07-14 RX ADMIN — DEXMEDETOMIDINE 20 MCG: 200 INJECTION, SOLUTION INTRAVENOUS at 21:42

## 2022-07-14 RX ADMIN — SODIUM CHLORIDE: 9 INJECTION, SOLUTION INTRAVENOUS at 15:57

## 2022-07-14 RX ADMIN — KETOROLAC TROMETHAMINE 15 MG: 30 INJECTION, SOLUTION INTRAMUSCULAR; INTRAVENOUS at 12:59

## 2022-07-14 RX ADMIN — ACETAMINOPHEN 1000 MG: 500 TABLET ORAL at 15:56

## 2022-07-14 RX ADMIN — OXYCODONE HYDROCHLORIDE 10 MG: 10 TABLET ORAL at 19:30

## 2022-07-14 RX ADMIN — DEXAMETHASONE SODIUM PHOSPHATE 10 MG: 4 INJECTION, SOLUTION INTRA-ARTICULAR; INTRALESIONAL; INTRAMUSCULAR; INTRAVENOUS; SOFT TISSUE at 21:32

## 2022-07-14 RX ADMIN — SODIUM CHLORIDE, POTASSIUM CHLORIDE, SODIUM LACTATE AND CALCIUM CHLORIDE: 600; 310; 30; 20 INJECTION, SOLUTION INTRAVENOUS at 21:19

## 2022-07-14 RX ADMIN — MORPHINE SULFATE 4 MG: 4 INJECTION INTRAVENOUS at 12:00

## 2022-07-14 RX ADMIN — OXYCODONE HYDROCHLORIDE 10 MG: 10 TABLET ORAL at 16:04

## 2022-07-14 RX ADMIN — MIDAZOLAM HYDROCHLORIDE 2 MG: 1 INJECTION, SOLUTION INTRAMUSCULAR; INTRAVENOUS at 21:19

## 2022-07-14 RX ADMIN — FENTANYL CITRATE 50 MCG: 50 INJECTION, SOLUTION INTRAMUSCULAR; INTRAVENOUS at 21:22

## 2022-07-14 ASSESSMENT — FIBROSIS 4 INDEX
FIB4 SCORE: 0.25
FIB4 SCORE: 0.49

## 2022-07-14 ASSESSMENT — LIFESTYLE VARIABLES
ON A TYPICAL DAY WHEN YOU DRINK ALCOHOL HOW MANY DRINKS DO YOU HAVE: 0
CONSUMPTION TOTAL: NEGATIVE
TOTAL SCORE: 0
EVER HAD A DRINK FIRST THING IN THE MORNING TO STEADY YOUR NERVES TO GET RID OF A HANGOVER: NO
DOES PATIENT WANT TO STOP DRINKING: NO
TOTAL SCORE: 0
ALCOHOL_USE: YES
TOTAL SCORE: 0
AVERAGE NUMBER OF DAYS PER WEEK YOU HAVE A DRINK CONTAINING ALCOHOL: 0
HAVE PEOPLE ANNOYED YOU BY CRITICIZING YOUR DRINKING: NO
HOW MANY TIMES IN THE PAST YEAR HAVE YOU HAD 5 OR MORE DRINKS IN A DAY: 0
HAVE YOU EVER FELT YOU SHOULD CUT DOWN ON YOUR DRINKING: NO
EVER FELT BAD OR GUILTY ABOUT YOUR DRINKING: NO

## 2022-07-14 ASSESSMENT — PAIN DESCRIPTION - PAIN TYPE
TYPE: ACUTE PAIN;SURGICAL PAIN
TYPE: ACUTE PAIN
TYPE: ACUTE PAIN;SURGICAL PAIN
TYPE: ACUTE PAIN
TYPE: ACUTE PAIN;SURGICAL PAIN

## 2022-07-14 ASSESSMENT — PATIENT HEALTH QUESTIONNAIRE - PHQ9
5. POOR APPETITE OR OVEREATING: SEVERAL DAYS
4. FEELING TIRED OR HAVING LITTLE ENERGY: SEVERAL DAYS
SUM OF ALL RESPONSES TO PHQ QUESTIONS 1-9: 4
1. LITTLE INTEREST OR PLEASURE IN DOING THINGS: NOT AT ALL
SUM OF ALL RESPONSES TO PHQ9 QUESTIONS 1 AND 2: 0
8. MOVING OR SPEAKING SO SLOWLY THAT OTHER PEOPLE COULD HAVE NOTICED. OR THE OPPOSITE, BEING SO FIGETY OR RESTLESS THAT YOU HAVE BEEN MOVING AROUND A LOT MORE THAN USUAL: NOT AT ALL
2. FEELING DOWN, DEPRESSED, IRRITABLE, OR HOPELESS: NOT AT ALL
3. TROUBLE FALLING OR STAYING ASLEEP OR SLEEPING TOO MUCH: SEVERAL DAYS
6. FEELING BAD ABOUT YOURSELF - OR THAT YOU ARE A FAILURE OR HAVE LET YOURSELF OR YOUR FAMILY DOWN: NOT AL ALL
9. THOUGHTS THAT YOU WOULD BE BETTER OFF DEAD, OR OF HURTING YOURSELF: NOT AT ALL
7. TROUBLE CONCENTRATING ON THINGS, SUCH AS READING THE NEWSPAPER OR WATCHING TELEVISION: SEVERAL DAYS

## 2022-07-14 NOTE — ED PROVIDER NOTES
ED Provider Note    Scribed for Deisy Thompson M.D. by Mabel Rivero. 7/14/2022  11:27 AM    Means of arrival: EMS  History obtained from: Patient  History limited by: None      CHIEF COMPLAINT  Chief Complaint   Patient presents with   • Post-Op Complications       HPI  Denia Lewis is a 21 y.o. female who presents to the Emergency Department as a transfer from [facility] for intermittent post-op bleeding after a tonsillectomy performed by Dr. Chevalier onset 4:00 AM this morning. She says the bleeding has improved since onset. She states she had a tonsillectomy performed on 7/8 and was prescribed oxycodone. She is additionally experiencing throat pain. She rates her pain as an 8/10 in severity. She says it is difficult to take her medications or eat secondary to pain. She denies fevers, dyspnea, chest pain, weakness, nausea, or vomiting. She denies any known personal or family history of bleeding disorders. She denies taking any aspirin or blood thinners. She has a history of Bipolar Disorder, Anxiety, and Depression and takes Lamictal.    REVIEW OF SYSTEMS  Pertinent positives include post-op bleeding and throat pain. Pertinent negatives include fevers, dyspnea, chest pain, weakness, nausea, or vomiting. All other systems reviewed and are negative.    PAST MEDICAL HISTORY   has a past medical history of Psychiatric disorder.    SOCIAL HISTORY  Social History     Tobacco Use   • Smoking status: Never Smoker   • Smokeless tobacco: Never Used   Vaping Use   • Vaping Use: Never used   Substance and Sexual Activity   • Alcohol use: Never   • Drug use: Never       SURGICAL HISTORY   has a past surgical history that includes tonsillectomy (7/8/2022).    CURRENT MEDICATIONS  Home Medications     Reviewed by Selma Parker (Pharmacy Tech) on 07/14/22 at 1314  Med List Status: Complete   Medication Last Dose Status   acetaminophen (TYLENOL) 500 MG Tab 7/13/2022 Active   amoxicillin-clavulanate  "(AUGMENTIN) 875-125 MG Tab 7/13/2022 Active   vitamin D3 (CHOLECALCIFEROL) 5000 Unit (125 mcg) Tab 7/12/2022 Active   ibuprofen (MOTRIN) 600 MG Tab 7/13/2022 Active   lamoTRIgine (LAMICTAL) 25 MG Tab 7/14/2022 Active   MELATONIN PO last week Active   omeprazole (PRILOSEC) 20 MG delayed-release capsule 7/13/2022 Active   oxyCODONE immediate release (ROXICODONE) 10 MG immediate release tablet 7/14/2022 Active   polyethylene glycol/lytes (MIRALAX) 17 g Pack not yet started Active   senna-docusate (PERICOLACE OR SENOKOT S) 8.6-50 MG Tab not yet started Active                ALLERGIES  Allergies   Allergen Reactions   • Tree Nuts Food Allergy Anaphylaxis, Shortness of Breath and Swelling     Almonds       PHYSICAL EXAM   VITAL SIGNS: /68   Pulse 99   Temp 36.4 °C (97.5 °F) (Temporal)   Resp 12   Ht 1.651 m (5' 5\")   Wt 90.7 kg (200 lb)   SpO2 93%   BMI 33.28 kg/m²    Constitutional: Nontoxic appearing, alert in no apparent distress.  HENT: Normocephalic, Atraumatic. Bilateral external ears normal. Nose normal.  Moist mucous membranes. White eschars in place over the posterior oropharynx, no active bleeding noted.  Eyes: Pupils are equal and reactive. Conjunctiva normal.   Neck: Supple, full range of motion  Heart: Regular rate and rhythm.  No murmurs.    Lungs: No respiratory distress, normal work of breathing. Lungs clear to auscultation bilaterally.  Abdomen Soft, no distention.  No tenderness to palpation.  Musculoskeletal: Atraumatic. No obvious deformities noted.  No lower extremity edema.  Skin: Warm, Dry.  No erythema, No rash.   Neurologic: Alert and oriented x3. Moving all extremities spontaneously without focal deficits.  Psychiatric: Affect normal, Mood normal, Appears appropriate and not intoxicated.    DIAGNOSTIC STUDIES    LABS  Personally reviewed by me  Labs Reviewed   CBC WITH DIFFERENTIAL - Abnormal; Notable for the following components:       Result Value    WBC 15.9 (*)     RBC 3.85 (*)  " "   Hemoglobin 11.7 (*)     Hematocrit 35.5 (*)     MCHC 33.0 (*)     Neutrophils-Polys 83.50 (*)     Lymphocytes 13.00 (*)     Neutrophils (Absolute) 13.28 (*)     All other components within normal limits   BASIC METABOLIC PANEL - Abnormal; Notable for the following components:    Bun 5 (*)     Calcium 8.1 (*)     All other components within normal limits   ESTIMATED GFR   DIFFERENTIAL MANUAL   PERIPHERAL SMEAR REVIEW   PLATELET ESTIMATE   MORPHOLOGY       ED COURSE  Vitals:    07/14/22 1259 07/14/22 1300 07/14/22 1330 07/14/22 1507   BP:  109/57 107/60 120/61   Pulse: 87 96 90 (!) 111   Resp: 16 16 16 20   Temp:    36.5 °C (97.7 °F)   TempSrc:    Temporal   SpO2: 99% 99% 99% 92%   Weight:    92.2 kg (203 lb 4.2 oz)   Height:    1.651 m (5' 5\")       Medications administered:    Old records personally reviewed:  The patient was admitted on 7/6 for peritonsillar abscess and had a tonsillectomy performed on 7/8. She was evaluated this morning at [facility] and had intermittent bleeding from the tonsils. At 5:00 AM, her WBC was 17, Hemoglobin was 14, and her platelets were 393. Dr. Chevalier was consulted and they recommended she be transferred to [facility].     11:27 AM - Patient seen and examined at bedside. The patient presents with post-op tonsillar bleeding. Ordered for CBC with diff and BMP to evaluate. Patient will be treated with morphine 4 mg and NS infusion 1000 ml for her symptoms.         MEDICAL DECISION MAKING  Patient who presents as transfer from outside hospital for concern for bleeding after tonsillectomy 6 days prior.  She has normal vitals on arrival.  Exam shows eschar in place without any signs of active bleeding.  Repeat labs show improvement of leukocytosis.  H/H has decreased however patient did receive fluids.  No signs of infectious process or airway compromise.    11:57 AM -  I discussed the patient's case and the above findings with Dr. Chevalier (ENT) who recommends " hospitalization for overnight monitoring.  No need for acute surgical needs at this time.    12:15 PM - Patient was reevaluated at bedside. I informed her about the plan for hospitalization at this time. Patient verbalizes understanding and agreement to this plan of care.     DISPOSITION:  Patient will be hospitalized by Dr. Melendez in guarded condition.      IMPRESSION  Post operative bleeding     Results, diagnoses, and treatment options were discussed with the patient and/or family. Patient verbalized understanding of plan of care.       Mabel DUNBAR (Toya), am scribing for, and in the presence of, Deisy Thompson M.D..    Electronically signed by: Mabel Rivero (Toya), 7/14/2022    Deisy DUNBAR M.D. personally performed the services described in this documentation, as scribed by Mabel Rivero in my presence, and it is both accurate and complete.    The note accurately reflects work and decisions made by me.  Deisy Thompson M.D.  7/14/2022  5:13 PM

## 2022-07-14 NOTE — PROGRESS NOTES
4 Eyes Skin Assessment Completed by Chung, RN and Natalie, RN.    Head WDL  Ears Redness and Blanching  From mask.  Nose WDL  Mouth WDL; minor bleeding post tonsillectomy.   Neck WDL  Breast/Chest WDL  Shoulder Blades WDL  Spine WDL  (R) Arm/Elbow/Hand WDL  (L) Arm/Elbow/Hand WDL  Abdomen WDL  Groin WDL  Scrotum/Coccyx/Buttocks WDL  (R) Leg WDL  (L) Leg WDL  (R) Heel/Foot/Toe WDL  (L) Heel/Foot/Toe WDL          Devices In Places Blood Pressure Cuff and Pulse Ox      Interventions In Place Pillows and Low Air Loss Mattress    Possible Skin Injury No    Pictures Uploaded Into Epic N/A  Wound Consult Placed N/A  RN Wound Prevention Protocol Ordered No

## 2022-07-14 NOTE — ED TRIAGE NOTES
Chief Complaint   Patient presents with   • Post-Op Complications     Vitals:    07/14/22 1146   BP: 120/60   Pulse: 96   Resp: 16   Temp: 36.4 °C (97.5 °F)   SpO2: 98%       Pt is a transfer from [facility] for an ENT consult, she had a tonsilectomy here on 7/8 of this month, now with post op bleeding. She noticed a clot dislodge this morning.     Pt has received 1.5L fluids thus far. She was somewhat hypotensive en route, but states she was sometimes hypotensive last visit too.     Pt arrives alert and oriented x 4 and ambulatory.

## 2022-07-14 NOTE — ED NOTES
Med Rec Complete per patient  Allergies Reviewed with patient  Patient's Preferred Pharmacy: [facility]. In [place], [place].   Local pharmacy she will use is [facility].         She is currently taking a 7 day course of Augmentin 875/125mg tabs that was started on 7/9/2022.

## 2022-07-14 NOTE — ED NOTES
Pt to CDU via pt tranpsort. Pt has all belongings at time of transfer. No belongings left in room after transfer.

## 2022-07-15 ENCOUNTER — PHARMACY VISIT (OUTPATIENT)
Dept: PHARMACY | Facility: MEDICAL CENTER | Age: 22
End: 2022-07-15
Payer: COMMERCIAL

## 2022-07-15 VITALS
RESPIRATION RATE: 18 BRPM | DIASTOLIC BLOOD PRESSURE: 55 MMHG | SYSTOLIC BLOOD PRESSURE: 98 MMHG | OXYGEN SATURATION: 92 % | HEIGHT: 65 IN | BODY MASS INDEX: 33.87 KG/M2 | WEIGHT: 203.26 LBS | TEMPERATURE: 97.7 F | HEART RATE: 87 BPM

## 2022-07-15 PROBLEM — J95.830 POST-TONSILLECTOMY HEMORRHAGE: Status: ACTIVE | Noted: 2022-07-15

## 2022-07-15 PROCEDURE — 96376 TX/PRO/DX INJ SAME DRUG ADON: CPT

## 2022-07-15 PROCEDURE — G0378 HOSPITAL OBSERVATION PER HR: HCPCS

## 2022-07-15 PROCEDURE — 700111 HCHG RX REV CODE 636 W/ 250 OVERRIDE (IP): Performed by: OTOLARYNGOLOGY

## 2022-07-15 PROCEDURE — 700102 HCHG RX REV CODE 250 W/ 637 OVERRIDE(OP): Performed by: OTOLARYNGOLOGY

## 2022-07-15 PROCEDURE — A9270 NON-COVERED ITEM OR SERVICE: HCPCS | Performed by: OTOLARYNGOLOGY

## 2022-07-15 PROCEDURE — 700105 HCHG RX REV CODE 258: Performed by: OTOLARYNGOLOGY

## 2022-07-15 PROCEDURE — 96375 TX/PRO/DX INJ NEW DRUG ADDON: CPT

## 2022-07-15 PROCEDURE — RXMED WILLOW AMBULATORY MEDICATION CHARGE: Performed by: OTOLARYNGOLOGY

## 2022-07-15 RX ORDER — OXYCODONE HYDROCHLORIDE 10 MG/1
5 TABLET ORAL EVERY 6 HOURS PRN
Qty: 12 TABLET | Refills: 0 | Status: SHIPPED | OUTPATIENT
Start: 2022-07-15 | End: 2022-07-15

## 2022-07-15 RX ORDER — AMOXICILLIN AND CLAVULANATE POTASSIUM 875; 125 MG/1; MG/1
1 TABLET, FILM COATED ORAL EVERY 12 HOURS
Qty: 10 TABLET | Refills: 0 | OUTPATIENT
Start: 2022-07-15 | End: 2022-07-20

## 2022-07-15 RX ORDER — HYDROMORPHONE HYDROCHLORIDE 1 MG/ML
1 INJECTION, SOLUTION INTRAMUSCULAR; INTRAVENOUS; SUBCUTANEOUS EVERY 6 HOURS PRN
Status: DISCONTINUED | OUTPATIENT
Start: 2022-07-15 | End: 2022-07-15

## 2022-07-15 RX ORDER — DEXAMETHASONE 4 MG/1
4 TABLET ORAL EVERY 12 HOURS
Status: DISCONTINUED | OUTPATIENT
Start: 2022-07-15 | End: 2022-07-15 | Stop reason: HOSPADM

## 2022-07-15 RX ORDER — AMOXICILLIN AND CLAVULANATE POTASSIUM 875; 125 MG/1; MG/1
1 TABLET, FILM COATED ORAL 2 TIMES DAILY
Qty: 14 TABLET | Refills: 0 | Status: SHIPPED | OUTPATIENT
Start: 2022-07-15 | End: 2022-07-22

## 2022-07-15 RX ORDER — OXYCODONE HYDROCHLORIDE 5 MG/1
5-10 TABLET ORAL EVERY 4 HOURS PRN
Qty: 42 TABLET | Refills: 0 | OUTPATIENT
Start: 2022-07-15 | End: 2022-07-22

## 2022-07-15 RX ORDER — ACETAMINOPHEN 500 MG
1000 TABLET ORAL EVERY 8 HOURS
Qty: 30 TABLET | Refills: 0 | OUTPATIENT
Start: 2022-07-15

## 2022-07-15 RX ORDER — DEXAMETHASONE 4 MG/1
4 TABLET ORAL EVERY 12 HOURS
Qty: 10 TABLET | Refills: 0 | Status: SHIPPED | OUTPATIENT
Start: 2022-07-15 | End: 2022-07-20

## 2022-07-15 RX ORDER — OXYCODONE HYDROCHLORIDE 10 MG/1
10 TABLET ORAL EVERY 6 HOURS PRN
Qty: 20 TABLET | Refills: 0 | Status: SHIPPED | OUTPATIENT
Start: 2022-07-15 | End: 2022-07-20

## 2022-07-15 RX ORDER — AMOXICILLIN AND CLAVULANATE POTASSIUM 875; 125 MG/1; MG/1
1 TABLET, FILM COATED ORAL EVERY 12 HOURS
Qty: 14 TABLET | Refills: 0 | Status: SHIPPED | OUTPATIENT
Start: 2022-07-15 | End: 2022-07-22

## 2022-07-15 RX ORDER — AMOXICILLIN AND CLAVULANATE POTASSIUM 875; 125 MG/1; MG/1
1 TABLET, FILM COATED ORAL EVERY 12 HOURS
Status: DISCONTINUED | OUTPATIENT
Start: 2022-07-15 | End: 2022-07-15 | Stop reason: HOSPADM

## 2022-07-15 RX ORDER — DEXAMETHASONE 4 MG/1
4 TABLET ORAL EVERY 12 HOURS
Qty: 10 TABLET | Refills: 0 | OUTPATIENT
Start: 2022-07-15

## 2022-07-15 RX ORDER — DEXAMETHASONE 4 MG/1
4 TABLET ORAL 2 TIMES DAILY
Qty: 10 TABLET | Refills: 0 | Status: SHIPPED | OUTPATIENT
Start: 2022-07-15 | End: 2022-07-20

## 2022-07-15 RX ADMIN — DEXAMETHASONE SODIUM PHOSPHATE 8 MG: 10 INJECTION INTRAMUSCULAR; INTRAVENOUS at 00:04

## 2022-07-15 RX ADMIN — HYDROMORPHONE HYDROCHLORIDE 1 MG: 1 INJECTION, SOLUTION INTRAMUSCULAR; INTRAVENOUS; SUBCUTANEOUS at 01:42

## 2022-07-15 RX ADMIN — DEXAMETHASONE SODIUM PHOSPHATE 8 MG: 10 INJECTION INTRAMUSCULAR; INTRAVENOUS at 05:36

## 2022-07-15 RX ADMIN — OXYCODONE HYDROCHLORIDE 10 MG: 10 TABLET ORAL at 04:25

## 2022-07-15 RX ADMIN — ACETAMINOPHEN 1000 MG: 500 TABLET ORAL at 05:39

## 2022-07-15 RX ADMIN — AMOXICILLIN AND CLAVULANATE POTASSIUM 1 TABLET: 875; 125 TABLET, FILM COATED ORAL at 08:55

## 2022-07-15 RX ADMIN — SODIUM CHLORIDE: 9 INJECTION, SOLUTION INTRAVENOUS at 05:42

## 2022-07-15 RX ADMIN — DEXAMETHASONE 4 MG: 4 TABLET ORAL at 09:09

## 2022-07-15 ASSESSMENT — PAIN DESCRIPTION - PAIN TYPE: TYPE: SURGICAL PAIN

## 2022-07-15 ASSESSMENT — PAIN SCALES - GENERAL: PAIN_LEVEL: 5

## 2022-07-15 NOTE — DISCHARGE INSTRUCTIONS
Discharge Instructions    Discharged to home by car with friend. Discharged via wheelchair, hospital escort: Yes.  Special equipment needed: Not Applicable    Be sure to schedule a follow-up appointment with your primary care doctor or any specialists as instructed.     Discharge Plan:        I understand that a diet low in cholesterol, fat, and sodium is recommended for good health. Unless I have been given specific instructions below for another diet, I accept this instruction as my diet prescription.   Other diet: Soft foods    Special Instructions: None    -Is this patient being discharged with medication to prevent blood clots?  No    Is patient discharged on Warfarin / Coumadin?   No       Tonsillectomy, Adult, Care After  This sheet gives you information about how to care for yourself after your procedure. Your doctor may also give you more specific instructions. If you have problems or questions, contact your doctor.  Follow these instructions at home:  Eating and drinking    Follow instructions from your doctor about eating and drinking.  For many days after surgery, choose foods that are soft and cold. Examples are:  Gelatin.  Sherbet.  Ice cream.  Frozen ice pops.  If you have an upset stomach (are nauseous), choose liquids that are cold and that you can see through (clear liquids). Examples are water and apple juice without pulp. You can try thick liquids and soft foods when you can eat without throwing up (vomiting) and without too much pain. Examples are:  Creamed soups.  Soft, warm cereals, such as oatmeal or hot wheat cereal.  Milk.  Mashed potatoes.  Applesauce.  Drink enough fluid to keep your pee (urine) clear or pale yellow.  Driving  Do not drive for 24 hours if you were given a medicine to help you relax (sedative).  Do not drive or use heavy machinery while taking prescription pain medicine or until your health care provider approves.  General instructions  Rest.  Keep your head raised  (elevated) when lying down.  Take medicines only as told by your doctor. These include over-the-counter medicines and prescription medicines.  Do not use mouthwashes until your doctor says it is okay.  Gargle only as told by your doctor.  Stay away from people who are sick.  Contact a doctor if:  Your pain gets worse or medicines do not help.  You have a fever.  You have a rash.  You feel light-headed or you pass out (faint).  You cannot swallow even a little liquid or spit (saliva).  Your pee is very dark.  Get help right away if:  You have trouble breathing.  You bleed bright red blood from your throat.  You throw up bright red blood.  Summary  Follow instructions from your doctor about what you cannot eat or drink. For many days after surgery, choose foods that are soft and cold.  Talk with your doctor about how to keep your pain under control. This can help you rest and swallow better.  Get help right away if you bleed bright red blood from your throat or you throw up bright red blood.  This information is not intended to replace advice given to you by your health care provider. Make sure you discuss any questions you have with your health care provider.  Document Released: 01/20/2012 Document Revised: 11/30/2018 Document Reviewed: 11/10/2017  [employer] Patient Education © 2020 [employer]          Soft diet x 2 weeks     Followup with Dr. Chevalier in 2-3 weeks

## 2022-07-15 NOTE — PROGRESS NOTES
Patient woke up with complaints of bleading from her throat and 10/10 pain. Patient currently spitting up blood. Surgeon being contacted by nightshift RN at this time.

## 2022-07-15 NOTE — DISCHARGE SUMMARY
Discharge Summary    CHIEF COMPLAINT ON ADMISSION  Chief Complaint   Patient presents with   • Post-Op Complications       Reason for Admission  EMS     Admission Date  7/14/2022    CODE STATUS  Full Code    HPI & HOSPITAL COURSE  This is a 21 y.o. female here with post tonsillectomy bleed   She underwent control of post tonsillectomy bleed 7/14/22      Therefore, she is discharged in good and stable condition to home with close outpatient follow-up.    The patient recovered much more quickly than anticipated on admission.    Discharge Date  7/15/22    FOLLOW UP ITEMS POST DISCHARGE       DISCHARGE DIAGNOSES  Principal Problem:    Postoperative pain POA: Yes  Resolved Problems:    * No resolved hospital problems. *      FOLLOW UP  No future appointments.  No follow-up provider specified.    MEDICATIONS ON DISCHARGE     Medication List      CONTINUE taking these medications      Instructions   amoxicillin-clavulanate 875-125 MG Tabs  Commonly known as: AUGMENTIN   Take 1 Tablet by mouth 2 times a day for 7 days.  Dose: 1 Tablet     Cholecalciferol 25 MCG (1000 UT) Tbdp   Take 1,000 Units by mouth every day.  Dose: 1,000 Units     ibuprofen 600 MG Tabs  Commonly known as: MOTRIN   Take 1 Tablet by mouth every 6 hours as needed for Moderate Pain for up to 7 days.  Dose: 600 mg     lamoTRIgine 25 MG Tabs  Commonly known as: LAMICTAL   Take 25 mg by mouth every day.  Dose: 25 mg     MELATONIN PO   Take 5 mg by mouth at bedtime as needed (Sleep).  Dose: 5 mg     omeprazole 20 MG delayed-release capsule  Commonly known as: PRILOSEC   Take 1 Capsule by mouth every day.  Dose: 20 mg     oxyCODONE immediate release 10 MG immediate release tablet  Commonly known as: ROXICODONE   Take 1 Tablet by mouth every 6 hours as needed for Severe Pain for up to 5 days.  Dose: 10 mg     PEG 3350 17 g Pack   Mix 1 Packet in 4-8oz liquid and drink  1 time a day as needed (if sennosides and docusate ineffective after 24 hours).  Dose: 17 g      Senna Plus 8.6-50 MG Tabs  Generic drug: senna-docusate   Take 2 Tablets by mouth 2 times a day.  Dose: 2 Tablet        ASK your doctor about these medications      Instructions   Acetaminophen Extra Strength 500 MG Tabs  Generic drug: acetaminophen   Take 1-2 Tablets by mouth every 6 hours as needed for Mild Pain for up to 7 days.  Dose: 500-1,000 mg            Allergies  Allergies   Allergen Reactions   • Tree Nuts Food Allergy Anaphylaxis, Shortness of Breath and Swelling     Almonds       DIET  Orders Placed This Encounter   Procedures   • Diet Order Diet: Full Liquid (advance to soft diet as tolerated)     Standing Status:   Standing     Number of Occurrences:   1     Order Specific Question:   Diet:     Answer:   Full Liquid [11]     Comments:   advance to soft diet as tolerated       ACTIVITY  As tolerated.  Weight bearing as tolerated    CONSULTATIONS       PROCEDURES  Control of post tonsillectomy bleed 3    LABORATORY  Lab Results   Component Value Date    SODIUM 140 07/14/2022    POTASSIUM 3.8 07/14/2022    CHLORIDE 107 07/14/2022    CO2 24 07/14/2022    GLUCOSE 99 07/14/2022    BUN 5 (L) 07/14/2022    CREATININE 0.53 07/14/2022        Lab Results   Component Value Date    WBC 15.9 (H) 07/14/2022    HEMOGLOBIN 11.7 (L) 07/14/2022    HEMATOCRIT 35.5 (L) 07/14/2022    PLATELETCT 327 07/14/2022        Total time of the discharge process exceeds   minutes.

## 2022-07-15 NOTE — PROGRESS NOTES
HD 2, POD 1 control of post tonsilletomy hemorrhage, POD 7 from tonsillectomy for left tonsil phlegmon    24 hr events:  OR with Dr. Thomas for control of post tonsillectomy bleeding from the right tonsillar fossa.  She was noted to desaturate during the case.    S:  Feeling weird and having pretty bad pain after surgery    O:  Desaturating to 80s on RA  Alert, mild distress  OC/OP: MMM, granulation bilaterally, no bleeding or clot, clear secretions    A/P:  POD 1 control of post tonsillectomy hemorrhage, POD 7 tonsillectomy for tonsillar phlegmon with sepsis  -Wean O2 as tolerated  -Ambulate TID  -IS Q1 hour  -Augmentin for possible aspiration PNA  -Oxy/APAP/Decadron for pain control  -DC pending resolution of O2 requirement

## 2022-07-15 NOTE — PROGRESS NOTES
Report received from night shift RN. Patient A&O, in bed resting comfortably. VSS, denies pain, bed in lowest position and locked, call light within reach.

## 2022-07-15 NOTE — PROGRESS NOTES
Patient weaned from supplemental oxygen during the morning. Patient educated on the use of incentive spirometer and used correctly. Patient back to baseline with no oxygen needs.

## 2022-07-15 NOTE — PROGRESS NOTES
Patient toileted, changed in to home clothes, given discharge instructions and discharged with friend via car. Patient to  meds at pharmacy upon discharge. All questions answered and belongings taken by patient.

## 2022-07-15 NOTE — ANESTHESIA PROCEDURE NOTES
Airway    Date/Time: 7/14/2022 9:25 PM  Performed by: Shukri Duenas M.D.  Authorized by: Shukri Duenas M.D.     Location:  OR  Urgency:  Elective  Indications for Airway Management:  Anesthesia      Spontaneous Ventilation: absent    Sedation Level:  Deep  Preoxygenated: Yes    Patient Position:  Sniffing  Mask Difficulty Assessment:  0 - not attempted  Final Airway Type:  Endotracheal airway  Final Endotracheal Airway:  ETT and FRANKLIN tube  Cuffed: Yes    Technique Used for Successful ETT Placement:  Direct laryngoscopy    Insertion Site:  Oral  Blade Type:  Tessie  Laryngoscope Blade/Videolaryngoscope Blade Size:  3  ETT Size (mm):  7.0  Measured from:  Lips  Placement Verified by: auscultation and capnometry    Cormack-Lehane Classification:  Grade I - full view of glottis  Number of Attempts at Approach:  1  Number of Other Approaches Attempted:  0

## 2022-07-15 NOTE — PROGRESS NOTES
Pt rounding. Pt in bed resting, administered prn pain medication per patient pain scale. Pt noted improvement in pain scale. Will continue to monitor.   pt experienced weakness currently on chemo pt states was dizzy.  has stage 4 breast CA pt experienced weakness currently on chemo pt states was dizzy.  has stage 4 breast CA currently taking oral chemo.     pt reports dizziness for couple weeks, double vision. tonight was unable to get herself out of the chair at home. reports this has happened a couple days this week.

## 2022-07-15 NOTE — ANESTHESIA PREPROCEDURE EVALUATION
Case: [id number] Date/Time: 07/14/22 2051    Procedure: TONSILLECTOMY - for tonsil bleed    Location: [facility] OR 10 / SURGERY [facility]    Surgeons: Leno Thomas IV, M.D.        Post-tonsillectomy bleed. Anxiety.     Relevant Problems   No relevant active problems       Physical Exam    Airway   Mallampati: III  TM distance: >3 FB  Neck ROM: full       Cardiovascular - normal exam  Rhythm: regular  Rate: normal  (-) murmur     Dental - normal exam           Pulmonary - normal exam  Breath sounds clear to auscultation     Abdominal    Neurological - normal exam                 Anesthesia Plan    ASA 2       Plan - general       Airway plan will be ETT          Induction: intravenous    Postoperative Plan: Postoperative administration of opioids is intended.    Pertinent diagnostic labs and testing reviewed    Informed Consent:    Anesthetic plan and risks discussed with patient.    Use of blood products discussed with: patient whom consented to blood products.

## 2022-07-15 NOTE — PROGRESS NOTES
Surgery Otolaryngology Daily Progress Note    Date of Service  7/15/2022    Chief Complaint  Denia Lewis is a 21 y.o. female admitted 7/14/2022 with tonsillectomy bleed     Hospital Course  No notes on file    Interval Problem Update       Pt did well overnight, however she did continue to require supplemental O2 to keep sats >90%. She is comfortable on 3L mask this morning. Remains afebrile         Code Status  Full Code    Disposition  Patient is medically cleared for discharge. If we can wean off the O2   Anticipate discharge to to home with close outpatient follow-up.  I have placed the appropriate orders for post-discharge needs.        Physical Exam  Temp:  [35.8 °C (96.4 °F)-37.1 °C (98.7 °F)] 36.5 °C (97.7 °F)  Pulse:  [] 95  Resp:  [12-21] 18  BP: ()/(51-71) 115/71  SpO2:  [87 %-100 %] 94 %    Physical Exam     Alert and awake, follows commands  Mouth: no bleeding, post surgical changes bilateral tonsil fossa  Face: O2 mask in place  Heart: regular pulse  Lungs: lung excursion symmetric bilateral     Fluids    Intake/Output Summary (Last 24 hours) at 7/15/2022 0722  Last data filed at 7/14/2022 2200  Gross per 24 hour   Intake 500 ml   Output 25 ml   Net 475 ml       Laboratory  Recent Labs     07/14/22  1203   WBC 15.9*   RBC 3.85*   HEMOGLOBIN 11.7*   HEMATOCRIT 35.5*   MCV 92.2   MCH 30.4   MCHC 33.0*   RDW 42.6   PLATELETCT 327   MPV 10.4     Recent Labs     07/14/22  1203   SODIUM 140   POTASSIUM 3.8   CHLORIDE 107   CO2 24   GLUCOSE 99   BUN 5*   CREATININE 0.53   CALCIUM 8.1*                   Imaging  CXR: perihilar infiltrate    Assessment/Plan  Post tonsillectomy bleed, s/p control of bleed 7/14/22  Postop desats with perihilar infiltrate. Possible aspiration pneumonia    Continue with decadron  Unasyn  Wean the O2 as tolerated  Insentive spirometry  DC planning if remains afebrile and able to wean off the O2

## 2022-07-15 NOTE — ANESTHESIA TIME REPORT
Anesthesia Start and Stop Event Times     Date Time Event    7/14/2022 2109 Ready for Procedure     2119 Anesthesia Start     2201 Anesthesia Stop        Responsible Staff  07/14/22    Name Role Begin End    Shukri Duenas M.D. Anesth 2119 2201        Overtime Reason:  no overtime (within assigned shift)    Comments:

## 2022-07-15 NOTE — PROGRESS NOTES
Shift change, assuming care of pt. Upon arrival to room, pt sitting up in bed leaning over spitting up moderate amount of blood. Suction yaunker and basin applied. This nurse notified the provider. After 10 minutes or so, the pt stated that the bleeding had some what subsided. Pt no longer spitting up moderate amount of blood. Pt made aware of plan, will continue to monitor and await for transport.

## 2022-07-15 NOTE — OR NURSING
Pt VSS. Pt on 3L Oxymask sating greater than 92%. Pt denies nausea and has mild pain. Pt to be transported back to room.

## 2022-07-15 NOTE — CARE PLAN
The patient is Stable - Low risk of patient condition declining or worsening    Shift Goals  Clinical Goals: Wean O2, discharge patient  Patient Goals: Pain control  Family Goals: Patient to feel better    Progress made toward(s) clinical / shift goals:    Problem: Pain - Standard  Goal: Alleviation of pain or a reduction in pain to the patient’s comfort goal  Outcome: Met     Problem: Knowledge Deficit - Standard  Goal: Patient and family/care givers will demonstrate understanding of plan of care, disease process/condition, diagnostic tests and medications  Outcome: Met       Patient is not progressing towards the following goals:

## 2022-07-15 NOTE — INTERVAL H&P NOTE
H&P updated. The patient was examined and informed consent was obtained. The plan is for a return to the OR for control of post tonsillectomy bleed.     I have reviewed the above history and physical. The patient had another episode of bleeding this evening.  At the time of my assessment there is some bright red blood on a towel at her bedside and a clot in the right tonsil fossa. She is not actively bleeding. I discussed recommendation for intraoperative control of tonsil bleeding. Pt understands and wished to proceed.     Infection, bleeding, pain, anesthesia, need for further surgery, tongue numbness or paralysis, change in taste, dental injury

## 2022-07-15 NOTE — OP REPORT
OPERATIVE REPORT    Patient: Denia Lewis  Date of Procedure: 7/14/2022    Procedure(s):    Control of tonsillectomy hemorrhage    Post tonsillectomy bleed    Post tonsillectomy bleed                    Surgeon: Surgeon(s):  Leno Thomas IV, M.D.    Assistants: none            Anesthesiologist/Type of Anesthesia:   Anesthesiologist: Shukri Duenas M.D./General    Specimens:  * No specimens in log *     Implants:   * No implants in log *          IVF: See anesthesia record    Estimated Blood Loss: 25 cc           Drain: None           Complications: None           Indications for Procedure: 21-year-old female with a history of tonsillectomy 7/9/2022.  The patient did well in the immediate postoperative period however she developed acute onset of bleeding early this morning.  She was subsequently transferred from an outside facility to [facility] where she was admitted for observation.  The patient then developed another bleeding episode earlier in the evening.  The decision was made to take her to the operating room for control of post tonsillectomy hemorrhage    Description of Procedure: Informed consent was obtained and the patient was taken the operating was placed supine on the operative table after induction of general endotracheal anesthesia the table is rotated 90 degrees.  A Lavelle Naman mouthgag was then placed and the patient was suspended on a Pantoja tray.  The oral cavity and oropharynx were inspected there was noted to be a large clot in the right inferior tonsil fossa.  The left tonsil fossa had a moist eschar that was present there was no evidence for clot or active bleeding.  The clot in the right tonsil fossa was suctioned and active bleeding was encountered.  This was controlled with suction cautery.  There was some remaining granulation tissue in the mid to superior portion of the right tonsil fossa this was similarly cauterized using the suction electrocautery.  The mouthgag was then released  and the tonsil fossa was reinspected there was no evidence for active bleeding.  The right tonsil fossa was then injected with 3 cc of 0.5% plain Marcaine.  The oropharynx was thoroughly irrigated with warm saline.  An NG tube was then passed into the patient's stomach and a large amount of old blood and gastric contents were suctioned.  Of note during the procedure it was noted that the patient easily desaturated.  This had also been noted on induction and it is possible the patient might have aspirated early in the day as this finding is unusual in an otherwise healthy 20-year-old female.  The patient was rotated back to anesthesia reawakened extubated and taken to postop recovery in stable condition.  There were no complications    Disposition: admit observation       7/14/2022 9:48 PM Leno Thomas IV, M.D.

## 2022-07-15 NOTE — H&P
Surgery Otolaryngology History & Physical Note    Date  7/14/2022    Primary Care Physician  No primary care provider on file.    CC  Post tonsillectomy hemorrhage.    HPI  This is a 21 y.o. female who presented with pos tonsillectomy bleeding.  She was initially admitted to [facility] on July 7 for left peritonsillar abscess meeting sepsis criteria.  This did not resolve with IV antibiotics.  She was taken to the operating room on July eighth for tonsillectomy.  Intraoperative findings were consistent with intratonsillar phlegmon.  She was started on oral antibiotics and discharged to home on the following day.  She was given return precautions and postoperative instructions upon discharge.    I spoke with an ED provider at [facility] who indicated that Denia came in at around 4 AM complaining of bleeding.  They had tried to transfer her to [facility] but [facility] was on divert.  She attempted to leave [place] but then was bleeding in the parking lot.  She went back into the ER and they called me.  She was not actively bleeding at that time.  They had not tried anything to control bleeding.  I recommended they try transferring her to [facility], [place], or [facility] where I have privileges and could take care of her.  She arrived at [facility] and they contacted me around 1145.  At that time she had no clot in the tonsillar fossa and no active bleeding.      She noted that she missed one of the doses of pain medication last night and so was in quite severe pain on presentation to the ED.  She was medicated with 10 mg of oxycodone and Tylenol upon arrival.  She feels like her pain is now much better controlled.  She reported expectorating what sounds like eschar within the last hour.  No active bleeding was noted.    Past Medical History:   Diagnosis Date   • Psychiatric disorder     bipolar disorder       Past Surgical History:   Procedure Laterality Date   • TONSILLECTOMY  7/8/2022    Procedure:  TONSILLECTOMY;  Surgeon: Danyell Melendez M.D.;  Location: SURGERY [facility];  Service: Ent       Current Facility-Administered Medications   Medication Dose Route Frequency Provider Last Rate Last Admin   • senna-docusate (PERICOLACE or SENOKOT S) 8.6-50 MG per tablet 2 Tablet  2 Tablet Oral BID Danyell Melendez M.D.   2 Tablet at 07/14/22 1732    And   • polyethylene glycol/lytes (MIRALAX) PACKET 1 Packet  1 Packet Oral QDAY PRN Danyell Melendez M.D.        And   • magnesium hydroxide (MILK OF MAGNESIA) suspension 30 mL  30 mL Oral QDAY PRN Danyell Melendez M.D.        And   • bisacodyl (DULCOLAX) suppository 10 mg  10 mg Rectal QDAY PRN Danyell Melendez M.D.       • NS infusion   Intravenous Continuous Danyell Melendez M.D. 75 mL/hr at 07/14/22 1557 New Bag at 07/14/22 1557   • ondansetron (ZOFRAN) syringe/vial injection 4 mg  4 mg Intravenous Q4HRS PRN Danyell Melendez M.D.       • ondansetron (ZOFRAN ODT) dispertab 4 mg  4 mg Oral Q4HRS PRN Danyell Melendez M.D.       • promethazine (PHENERGAN) tablet 12.5-25 mg  12.5-25 mg Oral Q4HRS PRN Danyell Melendez M.D.       • promethazine (PHENERGAN) suppository 12.5-25 mg  12.5-25 mg Rectal Q4HRS PRN Danyell Melendez M.D.       • prochlorperazine (COMPAZINE) injection 5-10 mg  5-10 mg Intravenous Q4HRS PRN Danyell Melendez M.D.       • acetaminophen (TYLENOL) tablet 1,000 mg  1,000 mg Oral Q8HRS Danyell Melenedz M.D.   1,000 mg at 07/14/22 1556   • oxyCODONE immediate-release (ROXICODONE) tablet 5 mg  5 mg Oral Q4HRS PRN Danyell Melendez M.D.       • oxyCODONE immediate release (ROXICODONE) tablet 10 mg  10 mg Oral Q4HRS PRN Danyell Melendez M.D.   10 mg at 07/14/22 1604       Social History     Socioeconomic History   • Marital status: Single     Spouse name: Not on file   • Number of children: Not on file   • Years of education: Not on file   • Highest education level: Not on file   Occupational History   •  Not on file   Tobacco Use   • Smoking status: Never Smoker   • Smokeless tobacco: Never Used   Vaping Use   • Vaping Use: Never used   Substance and Sexual Activity   • Alcohol use: Never   • Drug use: Never   • Sexual activity: Not on file   Other Topics Concern   • Not on file   Social History Narrative   • Not on file     Social Determinants of Health     Financial Resource Strain: Not on file   Food Insecurity: Not on file   Transportation Needs: Not on file   Physical Activity: Not on file   Stress: Not on file   Social Connections: Not on file   Intimate Partner Violence: Not on file   Housing Stability: Not on file       No family history on file.    Allergies  Tree nuts food allergy    Review of Systems  Negative except for as per HPI    Physical Exam  Constitutional:       Appearance: Normal appearance.   HENT:      Head: Normocephalic and atraumatic.      Right Ear: External ear normal.      Left Ear: External ear normal.      Nose: Nose normal.      Mouth/Throat:      Mouth: Mucous membranes are moist.      Comments: No bleeding or clot, granulation tissue in bilateral tonsillar fossa, no blood stained secretions  Neurological:      Mental Status: She is alert.         Vital Signs  Blood Pressure: (!) 97/51   Temperature: 37.1 °C (98.7 °F)   Pulse: 91   Respiration: 18   Pulse Oximetry: 98 %       Labs:  Recent Labs     07/14/22  1203   WBC 15.9*   RBC 3.85*   HEMOGLOBIN 11.7*   HEMATOCRIT 35.5*   MCV 92.2   MCH 30.4   MCHC 33.0*   RDW 42.6   PLATELETCT 327   MPV 10.4     Recent Labs     07/14/22  1203   SODIUM 140   POTASSIUM 3.8   CHLORIDE 107   CO2 24   GLUCOSE 99   BUN 5*   CREATININE 0.53   CALCIUM 8.1*               Radiology:  No orders to display         Assessment/Plan:  She is postoperative day 6 from tonsillectomy with postoperative hemorrhage earlier today.  She has not had active hemorrhage for a number of hours and has no bleeding or clot in the tonsillar fossa.  She will be admitted for  ongoing observation.  If there is recurrent bleeding she will likely require control in the operating room.  She will be kept NPO except sips with meds for the time being.

## 2022-07-15 NOTE — ANESTHESIA POSTPROCEDURE EVALUATION
Patient: Denia Lewis    Procedure Summary     Date: 07/14/22 Room / Location: [place] [address] / SURGERY [facility]    Anesthesia Start: 2119 Anesthesia Stop: 2201    Procedure: TONSILLECTOMY - for tonsil bleed (Throat) Diagnosis: (TONSILLECTOMY BLEED)    Surgeons: Leno Thomas IV, M.D. Responsible Provider: Shukri Duenas M.D.    Anesthesia Type: general ASA Status: 2          Final Anesthesia Type: general  Last vitals  BP   Blood Pressure: 105/67    Temp   (!) 35.8 °C (96.4 °F)    Pulse   (!) 107   Resp   12    SpO2   96 %      Anesthesia Post Evaluation    Patient location during evaluation: PACU  Patient participation: complete - patient participated  Level of consciousness: awake and alert  Pain score: 5    Airway patency: patent  Anesthetic complications: no  Cardiovascular status: hemodynamically stable  Respiratory status: acceptable  Hydration status: euvolemic    PONV: none          There were no known complications for this encounter.     Nurse Pain Score: 7 (NPRS)

## 2022-07-21 NOTE — DOCUMENTATION QUERY
"                                                                         Formerly [facility], [facility]                                                                       Query Response Note      PATIENT:               CHELLE WALDRON  ACCT #:                  [id number]  MRN:                     [id number]  :                      2000  ADMIT DATE:       2022 10:51 PM  DISCH DATE:        2022 1:41 PM  RESPONDING  PROVIDER #:        [id number]           QUERY TEXT:    The diagnosis of sepsis has been documented in the medical record.  After further review of the record, can clarification be determined?  Note: If an appropriate response is not listed below, please respond with a new note.    Sepsis - real or suspected infection plus 2 or more SIRS criteria  Temp <96.8 or >101  HR >90  RR >20  WBC <4,000 or > 12,000  >10% bandemia     Contact me with questions.    Thank you,  GUSTAVO Patel, CDI  [email or web address]    The patient's Clinical Indicators include:  22yo with dx of left tonsillar abscess, bipolar disorder, sepsis.       ED note \"SIRS syndrome, she will be admitted to the hospital for further care and evaluation\"   H&P \"Sepsis due to tonsillar abscess, transfer for ENT eval.\"   [facility] PN \"-IVF as indicated, continuous 83cc/hour currently, s/p IVF per sepsis protocol at outside hospital\"   [place] Op report \"Non-resolving left tonsil infection, history of recurrent strep tonsillitis, sepsis.\"   D/C summary \"Sepsis POA: unknown\"    Temp 97F- 101.1F  HR 48 - 126   RR 16 - 32     WBC 13.4     Risk factors: non-resolving left tonsillar abscess, history of recurrent strep tonsillitis  Treatments: IVF, antibiotics, medications, monitoring, surgical intervention, specialty consultation  Options provided:   -- Sepsis exists POA   -- Sepsis exists not POA   -- Sepsis does not exist   -- Other explanation, please specify findings   -- Unable to determine      Query created " by: Lala, April on 7/20/2022 8:10 AM    RESPONSE TEXT:    Sepsis exists not POA          Electronically signed by:  LEXII SANCHEZ MD 7/21/2022 4:39 PM

## (undated) DEVICE — CANISTER SUCTION 3000ML MECHANICAL FILTER AUTO SHUTOFF MEDI-VAC NONSTERILE LF DISP  (40EA/CA)

## (undated) DEVICE — PACK SINGLE BASIN - (6/CA)

## (undated) DEVICE — GLOVE BIOGEL SZ 7 SURGICAL PF LTX - (50PR/BX 4BX/CA)

## (undated) DEVICE — HEAD HOLDER JUNIOR/ADULT

## (undated) DEVICE — GLOVE BIOGEL PI INDICATOR SZ 6.5 SURGICAL PF LF - (50/BX 4BX/CA)

## (undated) DEVICE — ELECTRODE 850 FOAM ADHESIVE - HYDROGEL RADIOTRNSPRNT (50/PK)

## (undated) DEVICE — SET EXTENSION WITH 2 PORTS (48EA/CA) ***PART #2C8610 IS A SUBSTITUTE*****

## (undated) DEVICE — MASK ANESTHESIA ADULT  - (100/CA)

## (undated) DEVICE — SUCTION INSTRUMENT YANKAUER BULBOUS TIP W/O VENT (50EA/CA)

## (undated) DEVICE — DRAPE MAYO STAND - (30/CA)

## (undated) DEVICE — PACK ENT OR - (2EA/CA)

## (undated) DEVICE — TUBING CLEARLINK DUO-VENT - C-FLO (48EA/CA)

## (undated) DEVICE — TOWELS CLOTH SURGICAL - (4/PK 20PK/CA)

## (undated) DEVICE — GLOVE SZ 6 BIOGEL PI MICRO - PF LF (50PR/BX 4BX/CA)

## (undated) DEVICE — LACTATED RINGERS INJ 1000 ML - (14EA/CA 60CA/PF)

## (undated) DEVICE — MASK ANESTHESIA CHILD INFLATABLE CUSHION BUBBLEGUM (50EA/CS)

## (undated) DEVICE — GOWN WARMING STANDARD FLEX - (30/CA)

## (undated) DEVICE — TOWEL STOP TIMEOUT SAFETY FLAG (40EA/CA)

## (undated) DEVICE — SET LEADWIRE 5 LEAD BEDSIDE DISPOSABLE ECG (1SET OF 5/EA)

## (undated) DEVICE — ELECTRODE DUAL RETURN W/ CORD - (50/PK)

## (undated) DEVICE — Device

## (undated) DEVICE — MEDICINE CUP STERILE 2 OZ - (100/CA)

## (undated) DEVICE — BLADE SURGICAL #15 - (50/BX 3BX/CA)

## (undated) DEVICE — PROTECTOR ULNA NERVE - (36PR/CA)

## (undated) DEVICE — DRAPE LARGE 3 QUARTER - (20/CA)

## (undated) DEVICE — SODIUM CHL IRRIGATION 0.9% 1000ML (12EA/CA)

## (undated) DEVICE — KIT ANESTHESIA W/CIRCUIT & 3/LT BAG W/FILTER (20EA/CA)

## (undated) DEVICE — SLEEVE VASO CALF MED - (10PR/CA)

## (undated) DEVICE — BOVIE BLADE COATED &INSULATED - 25/PK

## (undated) DEVICE — SENSOR OXIMETER ADULT SPO2 RD SET (20EA/BX)

## (undated) DEVICE — GLOVE BIOGEL PI INDICATOR SZ 7.5 SURGICAL PF LF -(50/BX 4BX/CA)

## (undated) DEVICE — SYRINGE EAR/NOSE 3 OZ STERILE (50/CA